# Patient Record
Sex: FEMALE | Race: BLACK OR AFRICAN AMERICAN | NOT HISPANIC OR LATINO | Employment: OTHER | URBAN - METROPOLITAN AREA
[De-identification: names, ages, dates, MRNs, and addresses within clinical notes are randomized per-mention and may not be internally consistent; named-entity substitution may affect disease eponyms.]

---

## 2017-05-01 ENCOUNTER — HOSPITAL ENCOUNTER (INPATIENT)
Facility: HOSPITAL | Age: 67
LOS: 1 days | Discharge: HOME | DRG: 065 | End: 2017-05-02
Attending: EMERGENCY MEDICINE | Admitting: PSYCHIATRY & NEUROLOGY
Payer: MEDICARE

## 2017-05-01 DIAGNOSIS — R07.9 CHEST PAIN: ICD-10-CM

## 2017-05-01 DIAGNOSIS — R29.810 MOUTH DROOP: ICD-10-CM

## 2017-05-01 DIAGNOSIS — I63.311 THROMBOTIC STROKE INVOLVING RIGHT MIDDLE CEREBRAL ARTERY: Primary | ICD-10-CM

## 2017-05-01 DIAGNOSIS — R00.1 BRADYCARDIA: ICD-10-CM

## 2017-05-01 DIAGNOSIS — I25.10 CORONARY ARTERY DISEASE, ANGINA PRESENCE UNSPECIFIED, UNSPECIFIED VESSEL OR LESION TYPE, UNSPECIFIED WHETHER NATIVE OR TRANSPLANTED HEART: ICD-10-CM

## 2017-05-01 DIAGNOSIS — I20.89 ANGINA AT REST: ICD-10-CM

## 2017-05-01 PROBLEM — E78.5 HYPERLIPIDEMIA: Status: ACTIVE | Noted: 2017-05-01

## 2017-05-01 PROBLEM — E11.9 DIABETES MELLITUS TYPE II, CONTROLLED: Status: ACTIVE | Noted: 2017-05-01

## 2017-05-01 PROBLEM — I10 HYPERTENSION: Status: ACTIVE | Noted: 2017-05-01

## 2017-05-01 PROBLEM — G81.94 HEMIPARESIS, LEFT: Status: ACTIVE | Noted: 2017-05-01

## 2017-05-01 LAB
ALBUMIN SERPL BCP-MCNC: 4.3 G/DL
ALP SERPL-CCNC: 93 U/L
ALT SERPL W/O P-5'-P-CCNC: 15 U/L
ANION GAP SERPL CALC-SCNC: 9 MMOL/L
AST SERPL-CCNC: 18 U/L
BASOPHILS # BLD AUTO: 0.04 K/UL
BASOPHILS NFR BLD: 0.8 %
BILIRUB SERPL-MCNC: 0.9 MG/DL
BNP SERPL-MCNC: 176 PG/ML
BUN SERPL-MCNC: 16 MG/DL
CALCIUM SERPL-MCNC: 10.5 MG/DL
CHLORIDE SERPL-SCNC: 104 MMOL/L
CHOLEST/HDLC SERPL: 3.2 {RATIO}
CO2 SERPL-SCNC: 29 MMOL/L
CREAT SERPL-MCNC: 1 MG/DL
D DIMER PPP IA.FEU-MCNC: 0.41 MG/L FEU
DIFFERENTIAL METHOD: NORMAL
EOSINOPHIL # BLD AUTO: 0.1 K/UL
EOSINOPHIL NFR BLD: 2.3 %
ERYTHROCYTE [DISTWIDTH] IN BLOOD BY AUTOMATED COUNT: 13.4 %
EST. GFR  (AFRICAN AMERICAN): >60 ML/MIN/1.73 M^2
EST. GFR  (NON AFRICAN AMERICAN): 58 ML/MIN/1.73 M^2
GLUCOSE SERPL-MCNC: 89 MG/DL
HCT VFR BLD AUTO: 41.9 %
HDL/CHOLESTEROL RATIO: 31 %
HDLC SERPL-MCNC: 113 MG/DL
HDLC SERPL-MCNC: 35 MG/DL
HGB BLD-MCNC: 14.1 G/DL
INR PPP: 0.9
LDLC SERPL CALC-MCNC: 59.2 MG/DL
LYMPHOCYTES # BLD AUTO: 1.8 K/UL
LYMPHOCYTES NFR BLD: 38.1 %
MCH RBC QN AUTO: 29.6 PG
MCHC RBC AUTO-ENTMCNC: 33.7 %
MCV RBC AUTO: 88 FL
MONOCYTES # BLD AUTO: 0.4 K/UL
MONOCYTES NFR BLD: 8.6 %
NEUTROPHILS # BLD AUTO: 2.4 K/UL
NEUTROPHILS NFR BLD: 50 %
NONHDLC SERPL-MCNC: 78 MG/DL
PLATELET # BLD AUTO: 293 K/UL
PMV BLD AUTO: 10.3 FL
POCT GLUCOSE: 113 MG/DL (ref 70–110)
POTASSIUM SERPL-SCNC: 3.8 MMOL/L
PROT SERPL-MCNC: 8.7 G/DL
PROTHROMBIN TIME: 9.8 SEC
RBC # BLD AUTO: 4.77 M/UL
SODIUM SERPL-SCNC: 142 MMOL/L
TRIGL SERPL-MCNC: 94 MG/DL
TROPONIN I SERPL DL<=0.01 NG/ML-MCNC: <0.006 NG/ML
TSH SERPL DL<=0.005 MIU/L-ACNC: 1.31 UIU/ML
WBC # BLD AUTO: 4.78 K/UL

## 2017-05-01 PROCEDURE — 93005 ELECTROCARDIOGRAM TRACING: CPT

## 2017-05-01 PROCEDURE — 99285 EMERGENCY DEPT VISIT HI MDM: CPT

## 2017-05-01 PROCEDURE — 96374 THER/PROPH/DIAG INJ IV PUSH: CPT

## 2017-05-01 PROCEDURE — 85379 FIBRIN DEGRADATION QUANT: CPT

## 2017-05-01 PROCEDURE — 85025 COMPLETE CBC W/AUTO DIFF WBC: CPT

## 2017-05-01 PROCEDURE — 99223 1ST HOSP IP/OBS HIGH 75: CPT | Mod: ,,, | Performed by: PSYCHIATRY & NEUROLOGY

## 2017-05-01 PROCEDURE — 96372 THER/PROPH/DIAG INJ SC/IM: CPT

## 2017-05-01 PROCEDURE — 80053 COMPREHEN METABOLIC PANEL: CPT

## 2017-05-01 PROCEDURE — 93010 ELECTROCARDIOGRAM REPORT: CPT | Mod: 76,,, | Performed by: INTERNAL MEDICINE

## 2017-05-01 PROCEDURE — 83036 HEMOGLOBIN GLYCOSYLATED A1C: CPT

## 2017-05-01 PROCEDURE — 84443 ASSAY THYROID STIM HORMONE: CPT

## 2017-05-01 PROCEDURE — 83880 ASSAY OF NATRIURETIC PEPTIDE: CPT

## 2017-05-01 PROCEDURE — 80061 LIPID PANEL: CPT

## 2017-05-01 PROCEDURE — 63600175 PHARM REV CODE 636 W HCPCS: Performed by: EMERGENCY MEDICINE

## 2017-05-01 PROCEDURE — 82962 GLUCOSE BLOOD TEST: CPT

## 2017-05-01 PROCEDURE — 93010 ELECTROCARDIOGRAM REPORT: CPT | Mod: ,,, | Performed by: INTERNAL MEDICINE

## 2017-05-01 PROCEDURE — 99285 EMERGENCY DEPT VISIT HI MDM: CPT | Mod: ,,, | Performed by: EMERGENCY MEDICINE

## 2017-05-01 PROCEDURE — 20600001 HC STEP DOWN PRIVATE ROOM

## 2017-05-01 PROCEDURE — 84484 ASSAY OF TROPONIN QUANT: CPT

## 2017-05-01 PROCEDURE — 63600175 PHARM REV CODE 636 W HCPCS: Performed by: PHYSICIAN ASSISTANT

## 2017-05-01 PROCEDURE — 25000003 PHARM REV CODE 250: Performed by: PHYSICIAN ASSISTANT

## 2017-05-01 PROCEDURE — 25500020 PHARM REV CODE 255: Performed by: EMERGENCY MEDICINE

## 2017-05-01 PROCEDURE — 85610 PROTHROMBIN TIME: CPT

## 2017-05-01 RX ORDER — LEVOTHYROXINE SODIUM 75 UG/1
75 TABLET ORAL
Status: DISCONTINUED | OUTPATIENT
Start: 2017-05-02 | End: 2017-05-02 | Stop reason: HOSPADM

## 2017-05-01 RX ORDER — LORAZEPAM 2 MG/ML
1 INJECTION INTRAMUSCULAR
Status: COMPLETED | OUTPATIENT
Start: 2017-05-01 | End: 2017-05-01

## 2017-05-01 RX ORDER — LEVOBUNOLOL HYDROCHLORIDE 5 MG/ML
1 SOLUTION/ DROPS OPHTHALMIC 2 TIMES DAILY
Status: DISCONTINUED | OUTPATIENT
Start: 2017-05-01 | End: 2017-05-02 | Stop reason: HOSPADM

## 2017-05-01 RX ORDER — TRAMADOL HYDROCHLORIDE AND ACETAMINOPHEN 37.5; 325 MG/1; MG/1
1 TABLET, FILM COATED ORAL DAILY
COMMUNITY
End: 2017-05-01

## 2017-05-01 RX ORDER — ATORVASTATIN CALCIUM 20 MG/1
40 TABLET, FILM COATED ORAL DAILY
Status: DISCONTINUED | OUTPATIENT
Start: 2017-05-02 | End: 2017-05-02 | Stop reason: HOSPADM

## 2017-05-01 RX ORDER — ALBUTEROL SULFATE 0.83 MG/ML
2.5 SOLUTION RESPIRATORY (INHALATION) EVERY 4 HOURS PRN
COMMUNITY

## 2017-05-01 RX ORDER — LOSARTAN POTASSIUM 100 MG/1
100 TABLET ORAL DAILY
COMMUNITY

## 2017-05-01 RX ORDER — ASPIRIN 325 MG
325 TABLET, DELAYED RELEASE (ENTERIC COATED) ORAL DAILY
Status: DISCONTINUED | OUTPATIENT
Start: 2017-05-02 | End: 2017-05-02 | Stop reason: HOSPADM

## 2017-05-01 RX ORDER — INSULIN GLARGINE 100 [IU]/ML
30 INJECTION, SOLUTION SUBCUTANEOUS NIGHTLY
COMMUNITY

## 2017-05-01 RX ORDER — BUDESONIDE AND FORMOTEROL FUMARATE DIHYDRATE 160; 4.5 UG/1; UG/1
2 AEROSOL RESPIRATORY (INHALATION) EVERY 12 HOURS
COMMUNITY

## 2017-05-01 RX ORDER — ALBUTEROL SULFATE 90 UG/1
2 AEROSOL, METERED RESPIRATORY (INHALATION) EVERY 6 HOURS PRN
Status: DISCONTINUED | OUTPATIENT
Start: 2017-05-01 | End: 2017-05-02 | Stop reason: HOSPADM

## 2017-05-01 RX ORDER — LEVOTHYROXINE SODIUM 75 UG/1
75 TABLET ORAL DAILY
COMMUNITY

## 2017-05-01 RX ORDER — FLUTICASONE FUROATE AND VILANTEROL 100; 25 UG/1; UG/1
1 POWDER RESPIRATORY (INHALATION) DAILY
Status: DISCONTINUED | OUTPATIENT
Start: 2017-05-02 | End: 2017-05-02 | Stop reason: HOSPADM

## 2017-05-01 RX ORDER — LORAZEPAM 2 MG/ML
INJECTION INTRAMUSCULAR
Status: DISPENSED
Start: 2017-05-01 | End: 2017-05-02

## 2017-05-01 RX ORDER — ALBUTEROL SULFATE 90 UG/1
2 AEROSOL, METERED RESPIRATORY (INHALATION) EVERY 6 HOURS PRN
COMMUNITY

## 2017-05-01 RX ORDER — ATORVASTATIN CALCIUM 40 MG/1
40 TABLET, FILM COATED ORAL DAILY
COMMUNITY

## 2017-05-01 RX ORDER — ALBUTEROL SULFATE 0.83 MG/ML
2.5 SOLUTION RESPIRATORY (INHALATION) EVERY 4 HOURS PRN
Status: DISCONTINUED | OUTPATIENT
Start: 2017-05-01 | End: 2017-05-02 | Stop reason: HOSPADM

## 2017-05-01 RX ORDER — TRAZODONE HYDROCHLORIDE 150 MG/1
150 TABLET ORAL NIGHTLY
COMMUNITY

## 2017-05-01 RX ORDER — ENOXAPARIN SODIUM 100 MG/ML
40 INJECTION SUBCUTANEOUS EVERY 24 HOURS
Status: DISCONTINUED | OUTPATIENT
Start: 2017-05-01 | End: 2017-05-02 | Stop reason: HOSPADM

## 2017-05-01 RX ORDER — SODIUM CHLORIDE 0.9 % (FLUSH) 0.9 %
3 SYRINGE (ML) INJECTION EVERY 8 HOURS
Status: DISCONTINUED | OUTPATIENT
Start: 2017-05-01 | End: 2017-05-02 | Stop reason: HOSPADM

## 2017-05-01 RX ORDER — OXYBUTYNIN CHLORIDE 5 MG/1
5 TABLET, EXTENDED RELEASE ORAL DAILY
COMMUNITY

## 2017-05-01 RX ORDER — MECLIZINE HYDROCHLORIDE CHEWABLE TABLETS 25 MG/1
25 TABLET, CHEWABLE ORAL 3 TIMES DAILY PRN
COMMUNITY

## 2017-05-01 RX ORDER — GABAPENTIN 100 MG/1
100 CAPSULE ORAL EVERY 8 HOURS
Status: DISCONTINUED | OUTPATIENT
Start: 2017-05-01 | End: 2017-05-02 | Stop reason: HOSPADM

## 2017-05-01 RX ORDER — ALPRAZOLAM 1 MG/1
1 TABLET ORAL NIGHTLY
Status: DISCONTINUED | OUTPATIENT
Start: 2017-05-01 | End: 2017-05-02 | Stop reason: HOSPADM

## 2017-05-01 RX ORDER — AMLODIPINE AND BENAZEPRIL HYDROCHLORIDE 5; 10 MG/1; MG/1
1 CAPSULE ORAL DAILY
COMMUNITY
End: 2017-05-04 | Stop reason: ALTCHOICE

## 2017-05-01 RX ORDER — AMOXICILLIN 500 MG
2 CAPSULE ORAL DAILY
COMMUNITY

## 2017-05-01 RX ADMIN — GABAPENTIN 100 MG: 100 CAPSULE ORAL at 09:05

## 2017-05-01 RX ADMIN — SODIUM CHLORIDE, PRESERVATIVE FREE 3 ML: 5 INJECTION INTRAVENOUS at 09:05

## 2017-05-01 RX ADMIN — IOHEXOL 75 ML: 350 INJECTION, SOLUTION INTRAVENOUS at 07:05

## 2017-05-01 RX ADMIN — LORAZEPAM 1 MG: 2 INJECTION, SOLUTION INTRAMUSCULAR; INTRAVENOUS at 06:05

## 2017-05-01 RX ADMIN — ALPRAZOLAM 1 MG: 1 TABLET ORAL at 09:05

## 2017-05-01 RX ADMIN — ENOXAPARIN SODIUM 40 MG: 100 INJECTION SUBCUTANEOUS at 07:05

## 2017-05-01 RX ADMIN — LEVOBUNOLOL HYDROCHLORIDE 1 DROP: 5 SOLUTION/ DROPS OPHTHALMIC at 09:05

## 2017-05-01 NOTE — ASSESSMENT & PLAN NOTE
Nyasia Vazquez is a 66 y.o. female with L facial droop and UMN weakness on L. CT negative for acute abnormalities. Patient presented initially with chest pain. She may be admitted to medicine with cards consult for her chest pain. If so, then stroke team will follow patient as consult. However, if cardiology workup not needed, then will admit to stroke service for stroke workup.    Antithrombotics for secondary stroke prevention: Antiplatelets:  Aspirin: 325 mg oral now and daily, Statins for secondary stroke prevention and hyperlipidemia, if present: Atorvastatin- 40 mg oral daily, Aggressive risk factor modification: Hypertension, Diabetes, High Cholesterol and Carotid Artery disease, Rehab Efforts: Physical Therapy, Occupational Therapy and Speech and Language Pathology, Diagnostics: Ordered/Pending CTA Head to assess vasculature , CTA Neck/Arch to assess vasculature, HgbA1C to assess blood glucose levels, Lipid Profile to assess cholesterol levels, MRI head without contrast to assess brain parenchyma, Trans-thoracic cardiac echo to assess cardiac function/status, TSH to assess thyroid function, VTE Prophylaxis: Enoxaparin 40 mg SQ every 24 hours, BP Parameters: SBP <220, Nursing Orders: Neuro checks- Q4H, Complete ANDRA unless evaluated by speech, Out of bed BID, Avoid Rucker catheter, Pneumatic compression device, Stroke Education, Blood glucose target 100-130, Up with assistance and IV Fluids: NS @ 75cc/hr

## 2017-05-01 NOTE — CONSULTS
[facility]  Cardiology Service ER Consult Note    Attending Physician: Mckay Cabrera MD  Reason for Consult: Chest Pain    HPI:     Nyasia Vazquez is a 66 year old female patient with a history of HTN, DM, non-obstructive CAD. The patient presented to the ED with sharp, intermittent chest pain in the left upper chest (just below left sholder) with radiation to her left arm since yesterday afternoon. The pain has positional component (was worse with lying down and better with sitting up last night). No exertional or pleuritic component. No similar pain in the past. She experienced some SOB when she was walking to the ED from the parking lot. However she reports that she always gets similar SOB with activity.    After arrival to the ED, the patient was found to have left sided facial droop and left arm mild weakness. Vascular neurology was consulted and they diagnosed with patient with ischemic stroke involving the right MCA territory. CT is negative for intracranial hemorrhage.    Cardiology is consulted for chest pain. Patient reports having multiple stress tests 8-10 years ago. And she thinks she had an angiogram and she was told to have 'mild blockages but no need for a stent'    ROS: Patient denies any fever, chills, PND, orthopnea, palpitations.      PMH:     Past Medical History:   Diagnosis Date    Asthma     Diabetes mellitus     Hypertension      History reviewed. No pertinent surgical history.     Medications:     No current facility-administered medications on file prior to encounter.      Current Outpatient Prescriptions on File Prior to Encounter   Medication Sig Dispense Refill    alprazolam (XANAX) 1 MG tablet Take 1 mg by mouth every evening.       aspirin 81 MG Chew Take 81 mg by mouth once daily.      gabapentin (NEURONTIN) 100 MG capsule Take 1 capsule (100 mg total) by mouth every 8 (eight) hours. 30 capsule 11    glimepiride (AMARYL) 4 MG tablet Take 4 mg by mouth 2  (two) times daily.       hydrochlorothiazide (HYDRODIURIL) 25 MG tablet Take 25 mg by mouth once daily.      [DISCONTINUED] albuterol (ACCUNEB) 0.63 mg/3 mL Nebu Take 0.63 mg by nebulization every 6 (six) hours as needed.      [DISCONTINUED] ondansetron (ZOFRAN) 4 MG tablet Take 1 tablet (4 mg total) by mouth every 6 (six) hours. 12 tablet 0        Physical Exam:     Vitals:  Temp:  [98.4 °F (36.9 °C)]   Pulse:  [42-43]   Resp:  [20]   BP: (117-139)/(56-64)   SpO2:  [99 %-100 %]        Physical Exam   Constitutional: She is oriented to person, place, and time. No distress.   HENT:   Head: Normocephalic and atraumatic.   Eyes: No scleral icterus.   Neck: No JVD present.   Cardiovascular: Normal rate and regular rhythm.  Exam reveals no friction rub.    No murmur heard.  Pulmonary/Chest: She has no wheezes. She has no rales. She exhibits no tenderness.   Abdominal: There is no tenderness. There is no rebound.   Musculoskeletal: She exhibits no edema.   Neurological: She is alert and oriented to person, place, and time.   Left facial droop and decreased sensation on the left side of face.    Skin: Skin is warm and dry. She is not diaphoretic.   Psychiatric: She has a normal mood and affect. Her behavior is normal.         Labs:     Recent Results (from the past 336 hour(s))   CBC auto differential    Collection Time: 05/01/17  1:01 PM   Result Value Ref Range    WBC 4.78 3.90 - 12.70 K/uL    Hemoglobin 14.1 12.0 - 16.0 g/dL    Hematocrit 41.9 37.0 - 48.5 %    Platelets 293 150 - 350 K/uL       No results found for this or any previous visit (from the past 336 hour(s)).      Recent Labs  Lab 05/01/17  1301   TROPONINI <0.006       Imaging:  CXR: No acute pathologies    Echo:  - No echo in the system    EKG:   Marked sinus bradycardia    Telemetry:   Sinus bradycardia    Assessment & Recommendations:     66 year old female patient with a history of HTN, DM, non-obstructive CAD. The patient was seen with the following  diagnoses:    1) Right MCA stroke  - Vascular neurology saw the patient. Will need stroke work-up.  2) Left Chest / Shoulder / Arm Pain  - Non-anginal pain based on location and description of the pain.  - No findings of ACS since troponin is negative despite chest pain since yesterday. ECG with no ischemic changes.  - With these findings, I don't think this pain is of cardiac origin.  3) Sinus bradycardia  - Asymptomatic. Chronic. Reports do signs/symptoms of bradycardia.    Recommendations:  - OK to admit to hospital medicine or stroke service for stroke work-up.  - Telemetry while inpatient.  - Check 1 more set of troponin tonight. Otherwise no need for further cardiac work-up from our stand point.  - Please notify us if the patient has any major abnormalities on the Echo which will be performed for stroke.  - Avoid betablockers or calcium channel blockers because of bradycardia.    Thank you for this consult. Further recommendations are pending the attending addendum. Please do not hesitate to page with questions.     Signed:  Grupo Coy MD  Cardiology Fellow, PGY-6  Pager: [phone]  5/1/2017 3:51 PM    Attending Addendum:

## 2017-05-01 NOTE — PROVIDER PROGRESS NOTES - EMERGENCY DEPT.
Encounter Date: 5/1/2017    ED Physician Progress Notes       SCRIBE NOTE: I, Cong Smith, am scribing for, and in the presence of,  Dr. Monson.  Physician Statement: I, Dr. Monson, personally performed the services described in this documentation as scribed by Cong Smith in my presence, and it is both accurate and complete.     Physician Note:   5:55 PM. I was called to the bedside as pt was ready for transfer to MRI. Pt complains of of upper left and lateral chest discomfort described as sharp, stabbing, with small pressure component. EKG shows rate of 47, no acute ST T changes. Physical exam is unremarkable. Pt is somewhat anxious. She is somewhat concerned that her blood sugar was going down. Her blood sugar 1 hour ago was 113, but she was given a small amount of juice PO. Pt was given 1 mg Ativan IV. She will forego her MRI.

## 2017-05-01 NOTE — ASSESSMENT & PLAN NOTE
Stroke risk factor  On insulin and amaryl at home  D/C home regimen  SSI while in hospital, transition to basal insulin as needed

## 2017-05-01 NOTE — CONSULTS
[facility]  Vascular Neurology  Comprehensive Stroke Center  Consult Note      Inpatient consult to Vascular (Stroke) Neurology  Consult performed by: LEATHA FAGAN  Consult ordered by: ANGIE PORTILLO        Subjective:     History of Present Illness:  Nyasia Vazquez is a 66 y.o. Female PMHx of DM, arthritis, asthma, HTN, HLD, and CAD who presented to the ED with chest pain and diaphoresis. On exam, ED doctor noted that the patient had a L facial droop and consults stroke team for further evaluation.    On arrival, patient complained of intermittent chest pain. She denied weakness, numbness, difficulty speaking, vision changes, nausea, and vomiting. Patient examined her reflection and noted that her face was asymmetric. She is unsure of when the facial droop began. Patient did not notice any facial asymmetry until asked to look at her reflection. Last known normal was last night at 11pm when she went to sleep.           Past Medical History:   Diagnosis Date    Asthma     Diabetes mellitus     Hypertension      History reviewed. No pertinent surgical history.  History reviewed. No pertinent family history.  Social History   Substance Use Topics    Smoking status: Never Smoker    Smokeless tobacco: None    Alcohol use No     Review of patient's allergies indicates:  No Known Allergies  Medications: I have reviewed the current medication administration record.      (Not in a hospital admission)    Review of Systems   Constitutional: Positive for diaphoresis. Negative for chills and fever.   Eyes: Negative for visual disturbance.   Respiratory: Negative for cough and shortness of breath.    Cardiovascular: Positive for chest pain. Negative for palpitations.   Gastrointestinal: Negative for nausea and vomiting.   Neurological: Positive for facial asymmetry. Negative for dizziness, speech difficulty, weakness, numbness and headaches.   Psychiatric/Behavioral: Negative for  agitation and behavioral problems.     Objective:     Vital Signs (Most Recent):  Temp: 98.4 °F (36.9 °C) (05/01/17 1223)  Pulse: (!) 43 (05/01/17 1438)  Resp: 20 (05/01/17 1438)  BP: 139/64 (05/01/17 1438)  SpO2: 100 % (05/01/17 1438)    Vital Signs Range (Last 24H):  Temp:  [98.4 °F (36.9 °C)]   Pulse:  [42-43]   Resp:  [20]   BP: (117-139)/(56-64)   SpO2:  [99 %-100 %]     Physical Exam   Constitutional: She is oriented to person, place, and time. She appears well-developed and well-nourished. No distress.   HENT:   Head: Normocephalic and atraumatic.   Eyes: Pupils are equal, round, and reactive to light.   Neck: Normal range of motion.   Cardiovascular: Bradycardia present.    Pulmonary/Chest: Effort normal. No respiratory distress.   Abdominal: Soft. She exhibits no distension.   Musculoskeletal: Normal range of motion.   Neurological: She is alert and oriented to person, place, and time. GCS eye subscore is 4 - spontaneous. GCS verbal subscore is 5 - oriented. GCS motor subscore is 6 - obeys commands.   Skin: Skin is warm and dry.   Psychiatric: She has a normal mood and affect. Her behavior is normal.   Vitals reviewed.      Neurological Exam:   LOC: alert and follows requests  Language: No aphasia  Speech: No dysarthria  Orientation: Person, Place, Time  Memory: Recent memory intact, Remote memory intact, Age correct, Month correct  Visual Fields (CN II): Full  EOM (CN III, IV, VI): Full/intact  Pupils (CN III, IV, VI): PERRL  Facial Sensation (CN V): Symmetric  Facial Movement (CN VII): lower weakness left lower  Hearing (CN VIII): intact bilaterally  Motor*: Arm Left:  Paretic:  4/5, Leg Left:   Paretic:  4/5, Arm Right:   Normal (5/5), Leg Right:   Normal (5/5) patient with UMN weakness on L  Cerebellar*: Normal limb, Normal gait  , Normal stance  Sensation: intact to light touch, temperature and vibration  Tone: Arm-Left: normal; Leg-Left: normal; Arm-Right: normal; Leg-Right: normal    Stroke Team  Times:   Last Known Normal Date and Time : 201723:00  Symptom Onset Date and Time:2017Time onset of stroke symptoms: unknown.    Stroke Team Called Date and Time: 201714:42  Stroke Team Arrived Date and Time: 201714:45  CT Interpretation Time: 15:00  Intervention decision time: none.  NIH Stroke Scale:  Interval: baseline (upon arrival/admit)  Level of Consciousness: 0 - alert  LOC Questions: 0 - answers both correctly  LOC Commands: 0 - performs both correctly  Best Gaze: 0 - normal  Visual: 0 - no visual loss  Facial Palsy: 2 - partial  Motor Left Arm: 0 - no drift  Motor Right Arm: 0 - no drift  Motor Left Le - no drift  Motor Right Le - no drift  Limb Ataxia: 0 - absent  Sensory: 0 - normal  Best Language: 0 - no aphasia  Dysarthria: 0 - normal articulation  Extinction and Inattention: 0 - no neglect  NIH Stroke Scale Total: 2  Yamila Coma Scale:  Best Eye Response: 4 - spontaneous  Best Motor Response: 6 - obeys commands  Best Verbal Response: 5 - oriented  Yamila Coma Scale Total: 15  Modified Miami Scale:   Timeline: Prior to symptoms onset  Modified Miami Score: 0 - no symptoms        Laboratory:  CMP:   Recent Labs  Lab 17  1301   CALCIUM 10.5   ALBUMIN 4.3   PROT 8.7*      K 3.8   CO2 29      BUN 16   CREATININE 1.0   ALKPHOS 93   ALT 15   AST 18   BILITOT 0.9     CBC:   Recent Labs  Lab 17  1301   WBC 4.78   RBC 4.77   HGB 14.1   HCT 41.9      MCV 88   MCH 29.6   MCHC 33.7     Lipid Panel: No results for input(s): CHOL, LDLCALC, HDL, TRIG in the last 168 hours.  Coagulation:   Recent Labs  Lab 17  1301   INR 0.9     Platelet Aggregation Study: No results for input(s): PLTAGG, PLTAGINTERP, PLTAGREGLACO, ADPPLTAGGREG in the last 168 hours.  Hgb A1C: No results for input(s): HGBA1C in the last 168 hours.  TSH: No results for input(s): TSH in the last 168 hours.    Diagnostic Results:  Brain Imaging: CT Head. Date: 17  Acute Pathology:  None      Assessment/Plan:     Patient is a 66 y.o. year old female with:    Thrombotic stroke involving right middle cerebral artery  Nyasia Vazquez is a 66 y.o. female with L facial droop and UMN weakness on L. CT negative for acute abnormalities. Patient presented initially with chest pain. She may be admitted to medicine with cards consult for her chest pain. If so, then stroke team will follow patient as consult. However, if cardiology workup not needed, then will admit to stroke service for stroke workup.    Antithrombotics for secondary stroke prevention: Antiplatelets:  Aspirin: 325 mg oral now and daily, Statins for secondary stroke prevention and hyperlipidemia, if present: Atorvastatin- 40 mg oral daily, Aggressive risk factor modification: Hypertension, Diabetes, High Cholesterol and Carotid Artery disease, Rehab Efforts: Physical Therapy, Occupational Therapy and Speech and Language Pathology, Diagnostics: Ordered/Pending CTA Head to assess vasculature , CTA Neck/Arch to assess vasculature, HgbA1C to assess blood glucose levels, Lipid Profile to assess cholesterol levels, MRI head without contrast to assess brain parenchyma, Trans-thoracic cardiac echo to assess cardiac function/status, TSH to assess thyroid function, VTE Prophylaxis: Enoxaparin 40 mg SQ every 24 hours, BP Parameters: SBP <220, Nursing Orders: Neuro checks- Q4H, Complete ANDRA unless evaluated by speech, Out of bed BID, Avoid Rucker catheter, Pneumatic compression device, Stroke Education, Blood glucose target 100-130, Up with assistance and IV Fluids: NS @ 75cc/hr                                Hypertension  Stroke risk factor  Hold home BP meds and allow permissive hypertension in setting of possible acute stroke  Use PRN BP meds for SBP >220    Diabetes mellitus type II, controlled  Stroke risk factor  On insulin and amaryl at home  D/C home regimen  SSI while in hospital, transition to basal insulin as  needed    Hyperlipidemia  Stroke risk factor  Continue home atorvastatin 40  Order lipid panel    Coronary artery disease  Stroke risk factor  Increase to aspirin 325  Continue home atorvastatin 40    Hemiparesis, left  UMN weakness on L  Possibly 2/2 stroke  Order MRI brain  Therapy to evaluate and treat    Facial droop  Possibly 2/2 stroke  Therapy to evaluate and treat      Thrombolysis Candidate? No  1. Contraindications: outside window    Interventional Revascularization Candidate?  No; No significant neurological deficit    Research Candidate? No        Jordana Moore PA-C  [facility]  Department of Vascular Neurology   [facility]

## 2017-05-01 NOTE — IP AVS SNAPSHOT
[facility]  [address]  [facility] [address]  Phone: [phone]           Patient Discharge Instructions   Our goal is to set you up for success. This packet includes information on your condition, medications, and your home care.  It will help you care for yourself to prevent having to return to the hospital.     Please ask your nurse if you have any questions.      There are many details to remember when preparing to leave the hospital. Here is what you will need to do:    1. Take your medicine. If you are prescribed medications, review your Medication List on the following pages. You may have new medications to  at the pharmacy and others that you'll need to stop taking. Review the instructions for how and when to take your medications. Talk with your doctor or nurses if you are unsure of what to do.     2. Go to your follow-up appointments. Specific follow-up information is listed in the following pages. Your may be contacted by a nurse or clinical provider about future appointments. Be sure we have all of the phone numbers to reach you. Please contact your provider's office if you are unable to make an appointment.     3. Watch for warning signs. Your doctor or nurse will give you detailed warning signs to watch for and when to call for assistance. These instructions may also include educational information about your condition. If you experience any of warning signs to your health, call your doctor.           [facility]  Unless otherwise directed by your provider, please   contact [facility], our nurse care line   that is available for 24/7 assistance.     [phone] (toll-free)     Registered nurses in the [facility]   provide: appointment scheduling, clinical advisement, health education, and other advisory services.                  ** Verify the list of medication(s) below is accurate and up to date. Carry this with you in case of  emergency. If your medications have changed, please notify your healthcare provider.             Medication List      START taking these medications        Additional Info                      aspirin 325 MG tablet   Refills:  0   Dose:  325 mg   Replaces:  aspirin 81 MG Chew    Instructions:  Take 1 tablet (325 mg total) by mouth once daily.     Begin Date    AM    Noon    PM    Bedtime         CONTINUE taking these medications        Additional Info                      * albuterol 2.5 mg /3 mL (0.083 %) nebulizer solution   Commonly known as:  PROVENTIL   Refills:  0   Dose:  2.5 mg    Instructions:  Take 2.5 mg by nebulization every 4 (four) hours as needed for Wheezing. Rescue     Begin Date    AM    Noon    PM    Bedtime       * albuterol 90 mcg/actuation inhaler   Refills:  0   Dose:  2 puff    Instructions:  Inhale 2 puffs into the lungs every 6 (six) hours as needed for Wheezing or Shortness of Breath. Rescue     Begin Date    AM    Noon    PM    Bedtime       alprazolam 1 MG tablet   Commonly known as:  XANAX   Refills:  0   Dose:  1 mg    Last time this was given:  1 mg on 5/1/2017  9:44 PM   Instructions:  Take 1 mg by mouth every evening.     Begin Date    AM    Noon    PM    Bedtime       amlodipine-benazepril 5-10 mg 5-10 mg per capsule   Commonly known as:  LOTREL   Refills:  0   Dose:  1 capsule    Instructions:  Take 1 capsule by mouth once daily.     Begin Date    AM    Noon    PM    Bedtime       atorvastatin 40 MG tablet   Commonly known as:  LIPITOR   Refills:  0   Dose:  40 mg    Last time this was given:  40 mg on 5/2/2017  8:41 AM   Instructions:  Take 40 mg by mouth once daily.     Begin Date    AM    Noon    PM    Bedtime       BLOOD GLUCOSE TEST Strp   Refills:  0   Generic drug:  blood sugar diagnostic    Instructions:  by Misc.(Non-Drug; Combo Route) route.     Begin Date    AM    Noon    PM    Bedtime       budesonide-formoterol 160-4.5 mcg 160-4.5 mcg/actuation Hfaa   Commonly known  as:  SYMBICORT   Refills:  0   Dose:  2 puff    Instructions:  Inhale 2 puffs into the lungs every 12 (twelve) hours. Controller     Begin Date    AM    Noon    PM    Bedtime       fish oil-omega-3 fatty acids 300-1,000 mg capsule   Refills:  0   Dose:  2 g    Instructions:  Take 2 g by mouth once daily.     Begin Date    AM    Noon    PM    Bedtime       gabapentin 100 MG capsule   Commonly known as:  NEURONTIN   Quantity:  30 capsule   Refills:  11   Dose:  100 mg    Last time this was given:  100 mg on 5/2/2017  1:30 PM   Instructions:  Take 1 capsule (100 mg total) by mouth every 8 (eight) hours.     Begin Date    AM    Noon    PM    Bedtime       glimepiride 4 MG tablet   Commonly known as:  AMARYL   Refills:  0   Dose:  4 mg    Instructions:  Take 4 mg by mouth 2 (two) times daily.     Begin Date    AM    Noon    PM    Bedtime       hydrochlorothiazide 25 MG tablet   Commonly known as:  HYDRODIURIL   Refills:  0   Dose:  25 mg    Instructions:  Take 25 mg by mouth once daily.     Begin Date    AM    Noon    PM    Bedtime       insulin glargine 100 unit/mL injection   Commonly known as:  LANTUS   Refills:  0   Dose:  30 Units    Instructions:  Inject 30 Units into the skin every evening.     Begin Date    AM    Noon    PM    Bedtime       insulin NPH-insulin regular (70/30) 100 unit/mL (70-30) injection   Commonly known as:  NOVOLIN 70/30   Refills:  0    Instructions:  Inject into the skin 2 (two) times daily.     Begin Date    AM    Noon    PM    Bedtime       levothyroxine 75 MCG tablet   Commonly known as:  SYNTHROID   Refills:  0   Dose:  75 mcg    Last time this was given:  75 mcg on 5/2/2017  5:14 AM   Instructions:  Take 75 mcg by mouth once daily.     Begin Date    AM    Noon    PM    Bedtime       losartan 100 MG tablet   Commonly known as:  COZAAR   Refills:  0   Dose:  100 mg    Instructions:  Take 100 mg by mouth once daily.     Begin Date    AM    Noon    PM    Bedtime       meclizine 32 MG tablet    Commonly known as:  ANTIVERT   Refills:  0   Dose:  25 mg    Instructions:  Take 25 mg by mouth 3 (three) times daily as needed.     Begin Date    AM    Noon    PM    Bedtime       oxybutynin 5 MG Tr24   Commonly known as:  DITROPAN-XL   Refills:  0   Dose:  5 mg    Instructions:  Take 5 mg by mouth once daily.     Begin Date    AM    Noon    PM    Bedtime       timolol 0.5 % ophthalmic solution   Refills:  0   Dose:  1 drop    Instructions:  Place 1 drop into both eyes 2 (two) times daily.     Begin Date    AM    Noon    PM    Bedtime       trazodone 150 MG tablet   Commonly known as:  DESYREL   Refills:  0   Dose:  150 mg    Instructions:  Take 150 mg by mouth nightly.     Begin Date    AM    Noon    PM    Bedtime       * Notice:  This list has 2 medication(s) that are the same as other medications prescribed for you. Read the directions carefully, and ask your doctor or other care provider to review them with you.      STOP taking these medications     aspirin 81 MG Chew   Replaced by:  aspirin 325 MG tablet            Where to Get Your Medications      You can get these medications from any pharmacy     You don't need a prescription for these medications     aspirin 325 MG tablet                  Please bring to all follow up appointments:    1. A copy of your discharge instructions.  2. All medicines you are currently taking in their original bottles.  3. Identification and insurance card.    Please arrive 15 minutes ahead of scheduled appointment time.    Please call 24 hours in advance if you must reschedule your appointment and/or time.        Your Scheduled Appointments     Jun 20, 2017 10:20 AM CDT   New Patient with GRAZYNA Hernandez - Neurology ([facility] )    [address]  [facility] [address] [phone]              Follow-up Information     Follow up with Jordana Lawson PA-C On 6/20/2017.    Specialty:  Neurology    Why:  @ 10:20    Contact information:     [address]  [facility] [address]  [phone]          Follow up with Dr. Raza Miller, DO In 1 week.    Why:  Diabetes management, A1C 8.7    Contact information:    [facility]        Discharge Instructions     Future Orders    Activity as tolerated     Call 911 for any of the following:     Comments:    Call 911  right away if any of the following warning signs come on suddenly, even if the symptoms only last for a few minutes. With stroke, timing is very important.   - Warning Signs of Stroke:  - Weakness: You may feel a sudden weakness, tingling or loss of feeling on one side of your face or body.  - Vision Problems: You may have sudden double vision or trouble seeing in one or both eyes.  - Speech Problems: You may have sudden trouble talking, slured speech, or problems understanding others.  - Headache: You may have sudden, severe headache.  - Movement Problems: You may experience dizziness, a feeling of spinning, a loss of balance, a feeling of falling or blackouts.    Diet Cardiac     Scheduling Instructions:    Diabetic diet    Comments:    See Stroke Patient Education Guide Booklet for details.        Primary Diagnosis     Your primary diagnosis was:  Stroke      Admission Information     Date & Time Provider Department CSN    5/1/2017 12:35 PM Kirt Regalado MD [facility] [id number]      Care Providers     Provider Role Specialty Primary office phone    Kirt Regalado MD Attending Provider Neurology [phone]    Evaristo Montelongo MD Team Attending  Neuro Critical Care [phone]    Nj Meza MD Team Attending  Neuro Critical Care [phone]    Lane Shepard MD Team Attending  Interventional Neurology [phone]    Kendall Joe MD Team Attending  Neurology [phone]      Your Vitals Were     BP Pulse Temp Resp Height Weight    134/65 (BP Location: Left arm, Patient Position: Lying, BP Method: Automatic) 49 99  "°F (37.2 °C) (Oral) 16 5' 6" (1.676 m) 90.3 kg (199 lb)    SpO2 BMI             99% 32.12 kg/m2         Recent Lab Values        5/1/2017                           6:53 PM           A1C 8.7 (H)           Comment for A1C at  6:53 PM on 5/1/2017:  According to ADA guidelines, hemoglobin A1C <7.0% represents  optimal control in non-pregnant diabetic patients.  Different  metrics may apply to specific populations.   Standards of Medical Care in Diabetes - 2016.  For the purpose of screening for the presence of diabetes:  <5.7%     Consistent with the absence of diabetes  5.7-6.4%  Consistent with increasing risk for diabetes   (prediabetes)  >or=6.5%  Consistent with diabetes  Currently no consensus exists for use of hemoglobin A1C  for diagnosis of diabetes for children.        Allergies as of 5/2/2017     No Known Allergies      Advance Directives     An advance directive is a document which, in the event you are no longer able to make decisions for yourself, tells your healthcare team what kind of treatment you do or do not want to receive, or who you would like to make those decisions for you.  If you do not currently have an advance directive, [facility] encourages you to create one.  For more information call:  [phone]), [phone] ([phone]),  or log on to [email or web address].        Language Assistance Services     ATTENTION: Language assistance services are available, free of charge. Please call [phone].      ATENCIÓN: Si habla español, tiene a mckeon disposición servicios gratuitos de asistencia lingüística. Llame al [phone].     [facility] Ý: N?u b?n nói Ti?ng Vi?t, có các d?ch v? h? tr? ngôn ng? mi?n phí dành cho b?n. G?i s? [phone].        Stroke Education              Diabetes Discharge Instructions                                   [facility] Sign-Up     Activating your [facility] account is as easy as 1-2-3!     1) Visit [email or web address], select Sign Up Now, enter this " activation code and your date of birth, then select Next.  [email or web address]  Expires: 6/16/2017  3:02 PM      2) Create a username and password to use when you visit [facility] in the future and select a security question in case you lose your password and select Next.    3) Enter your e-mail address and click Sign Up!    Additional Information  If you have questions, please e-mail [email or web address] or call [phone] to talk to our [facility] staff. Remember, [facility] is NOT to be used for urgent needs. For medical emergencies, dial 911.          [facility] complies with applicable Federal civil rights laws and does not discriminate on the basis of race, color, national origin, age, disability, or sex.

## 2017-05-01 NOTE — ASSESSMENT & PLAN NOTE
Stroke risk factor  Hold home BP meds and allow permissive hypertension in setting of possible acute stroke  Use PRN BP meds for SBP >220

## 2017-05-01 NOTE — SUBJECTIVE & OBJECTIVE
Past Medical History:   Diagnosis Date    Asthma     Diabetes mellitus     Hypertension      History reviewed. No pertinent surgical history.  History reviewed. No pertinent family history.  Social History   Substance Use Topics    Smoking status: Never Smoker    Smokeless tobacco: None    Alcohol use No     Review of patient's allergies indicates:  No Known Allergies  Medications: I have reviewed the current medication administration record.      (Not in a hospital admission)    Review of Systems   Constitutional: Positive for diaphoresis. Negative for chills and fever.   Eyes: Negative for visual disturbance.   Respiratory: Negative for cough and shortness of breath.    Cardiovascular: Positive for chest pain. Negative for palpitations.   Gastrointestinal: Negative for nausea and vomiting.   Neurological: Positive for facial asymmetry. Negative for dizziness, speech difficulty, weakness, numbness and headaches.   Psychiatric/Behavioral: Negative for agitation and behavioral problems.     Objective:     Vital Signs (Most Recent):  Temp: 98.4 °F (36.9 °C) (05/01/17 1223)  Pulse: (!) 43 (05/01/17 1438)  Resp: 20 (05/01/17 1438)  BP: 139/64 (05/01/17 1438)  SpO2: 100 % (05/01/17 1438)    Vital Signs Range (Last 24H):  Temp:  [98.4 °F (36.9 °C)]   Pulse:  [42-43]   Resp:  [20]   BP: (117-139)/(56-64)   SpO2:  [99 %-100 %]     Physical Exam   Constitutional: She is oriented to person, place, and time. She appears well-developed and well-nourished. No distress.   HENT:   Head: Normocephalic and atraumatic.   Eyes: Pupils are equal, round, and reactive to light.   Neck: Normal range of motion.   Cardiovascular: Bradycardia present.    Pulmonary/Chest: Effort normal. No respiratory distress.   Abdominal: Soft. She exhibits no distension.   Musculoskeletal: Normal range of motion.   Neurological: She is alert and oriented to person, place, and time. GCS eye subscore is 4 - spontaneous. GCS verbal subscore is 5 -  oriented. GCS motor subscore is 6 - obeys commands.   Skin: Skin is warm and dry.   Psychiatric: She has a normal mood and affect. Her behavior is normal.   Vitals reviewed.      Neurological Exam:   LOC: alert and follows requests  Language: No aphasia  Speech: No dysarthria  Orientation: Person, Place, Time  Memory: Recent memory intact, Remote memory intact, Age correct, Month correct  Visual Fields (CN II): Full  EOM (CN III, IV, VI): Full/intact  Pupils (CN III, IV, VI): PERRL  Facial Sensation (CN V): Symmetric  Facial Movement (CN VII): lower weakness left lower  Hearing (CN VIII): intact bilaterally  Motor*: Arm Left:  Paretic:  4/5, Leg Left:   Paretic:  4/5, Arm Right:   Normal (5/5), Leg Right:   Normal (5/5) patient with UMN weakness on L  Cerebellar*: Normal limb, Normal gait  , Normal stance  Sensation: intact to light touch, temperature and vibration  Tone: Arm-Left: normal; Leg-Left: normal; Arm-Right: normal; Leg-Right: normal    Stroke Team Times:   Last Known Normal Date and Time : 201723:00  Symptom Onset Date and Time:2017Time onset of stroke symptoms: unknown.    Stroke Team Called Date and Time: 201714:42  Stroke Team Arrived Date and Time: 201714:45  CT Interpretation Time: 15:00  Intervention decision time: none.  NIH Stroke Scale:  Interval: baseline (upon arrival/admit)  Level of Consciousness: 0 - alert  LOC Questions: 0 - answers both correctly  LOC Commands: 0 - performs both correctly  Best Gaze: 0 - normal  Visual: 0 - no visual loss  Facial Palsy: 2 - partial  Motor Left Arm: 0 - no drift  Motor Right Arm: 0 - no drift  Motor Left Le - no drift  Motor Right Le - no drift  Limb Ataxia: 0 - absent  Sensory: 0 - normal  Best Language: 0 - no aphasia  Dysarthria: 0 - normal articulation  Extinction and Inattention: 0 - no neglect  NIH Stroke Scale Total: 2  Yamila Coma Scale:  Best Eye Response: 4 - spontaneous  Best Motor Response: 6 - obeys commands  Best  Verbal Response: 5 - oriented  [place] Coma Scale Total: 15  Modified Sebastian Scale:   Timeline: Prior to symptoms onset  Modified Sebastian Score: 0 - no symptoms        Laboratory:  CMP:   Recent Labs  Lab 05/01/17  1301   CALCIUM 10.5   ALBUMIN 4.3   PROT 8.7*      K 3.8   CO2 29      BUN 16   CREATININE 1.0   ALKPHOS 93   ALT 15   AST 18   BILITOT 0.9     CBC:   Recent Labs  Lab 05/01/17  1301   WBC 4.78   RBC 4.77   HGB 14.1   HCT 41.9      MCV 88   MCH 29.6   MCHC 33.7     Lipid Panel: No results for input(s): CHOL, LDLCALC, HDL, TRIG in the last 168 hours.  Coagulation:   Recent Labs  Lab 05/01/17  1301   INR 0.9     Platelet Aggregation Study: No results for input(s): PLTAGG, PLTAGINTERP, PLTAGREGLACO, ADPPLTAGGREG in the last 168 hours.  Hgb A1C: No results for input(s): HGBA1C in the last 168 hours.  TSH: No results for input(s): TSH in the last 168 hours.    Diagnostic Results:  Brain Imaging: CT Head. Date: 05/01/17  Acute Pathology: None

## 2017-05-01 NOTE — ED PROVIDER NOTES
Encounter Date: 5/1/2017    SCRIBE #1 NOTE: I, Naomi Bar, am scribing for, and in the presence of,  Dr. Cabrera. I have scribed the following portions of the note - the Resident attestation.   SCRIBE #2 NOTE: I, Cong Smith, am scribing for, and in the presence of,  Dr. Monson. I have scribed the following portions of the note - the EKG reading.     History     Chief Complaint   Patient presents with    Chest Pain     with sob started yest, denies cardiac hx     Review of patient's allergies indicates:  No Known Allergies  HPI Comments: 67 y/o F PMHx DM2, HTN p/w chest pain x1d.  Patient notes symptoms started last night, involve last sided sharp, crushing chest pain that radiates down left arm. Pain is worse with laying down but improves in upright position, she has never had pain like this before, walking did not make it worse, sitting does not improve. Has had accompanying SOB, diaphoresis, has not had accompanying weakness/numbness, anxiety, hemoptysis, cough, N/V, recent illness, light-headedness, vertigo.     Upon initial exam left mouth droop was noted + nasolabial fold flattening, daughter confirms that this is new as of today (daughter lives with patient).  Patient said that her face looked normal in the mirror last night.     The history is provided by the patient and a relative.     Past Medical History:   Diagnosis Date    Asthma     Diabetes mellitus     Hypertension      History reviewed. No pertinent surgical history.  History reviewed. No pertinent family history.  Social History   Substance Use Topics    Smoking status: Never Smoker    Smokeless tobacco: None    Alcohol use No     Review of Systems   Constitutional: Positive for diaphoresis. Negative for chills and fever.   HENT: Negative for facial swelling, rhinorrhea, trouble swallowing and voice change.    Eyes: Negative for photophobia, pain and visual disturbance.   Respiratory: Positive for shortness of breath. Negative for cough,  choking, chest tightness and stridor.    Cardiovascular: Positive for chest pain. Negative for palpitations and leg swelling.   Gastrointestinal: Negative for diarrhea, nausea and vomiting.   Genitourinary: Negative for dysuria and hematuria.   Musculoskeletal: Negative for arthralgias, myalgias, neck pain and neck stiffness.   Skin: Negative for color change, pallor, rash and wound.   Neurological: Negative for dizziness, seizures, syncope, facial asymmetry, speech difficulty, weakness and light-headedness.       Physical Exam   Initial Vitals   BP Pulse Resp Temp SpO2   05/01/17 1223 05/01/17 1223 05/01/17 1223 05/01/17 1223 05/01/17 1223   117/56 42 20 98.4 °F (36.9 °C) 99 %     Physical Exam    Nursing note and vitals reviewed.  Constitutional: She appears well-developed and well-nourished. She is not diaphoretic. No distress.   HENT:   Head: Normocephalic and atraumatic.   Right Ear: External ear normal.   Left Ear: External ear normal.   Mouth/Throat: Oropharynx is clear and moist. No oropharyngeal exudate.   Eyes: Conjunctivae and EOM are normal. Pupils are equal, round, and reactive to light. Right eye exhibits no discharge. Left eye exhibits no discharge. No scleral icterus.   Neck: Normal range of motion. No tracheal deviation present. No JVD present.   Cardiovascular: Regular rhythm, normal heart sounds and intact distal pulses. Exam reveals no friction rub.    No murmur heard.  Bradycardic to 42   Pulmonary/Chest: Breath sounds normal. No stridor. No respiratory distress. She has no wheezes. She has no rhonchi. She exhibits no tenderness.   Abdominal: Soft. There is no tenderness. There is no rebound and no guarding.   Musculoskeletal: Normal range of motion. She exhibits no edema.   Neurological: She is alert and oriented to person, place, and time. She has normal strength. A cranial nerve deficit is present. No sensory deficit. Coordination normal.   Left mouth droop with nasal labial fold  Sensation  to light touch equal in CN V and extremities  No slurred speech, periorbital weakness, trouble swallowing, shoulder weakness, EOM abnormality on exam.  Muscle strength 5/5 in all extremities.          ED Course   Procedures  Labs Reviewed   COMPREHENSIVE METABOLIC PANEL - Abnormal; Notable for the following:        Result Value    Total Protein 8.7 (*)     eGFR if non  58.0 (*)     All other components within normal limits   B-TYPE NATRIURETIC PEPTIDE - Abnormal; Notable for the following:      (*)     All other components within normal limits   CBC W/ AUTO DIFFERENTIAL   TROPONIN I   PROTIME-INR   D DIMER, QUANTITATIVE   D DIMER, QUANTITATIVE    Narrative:     ADD ON PER DR PORTILLO, D DIMER, ORDER #[id number]   14:01  05/01/2017      EKG Readings: (Independently Interpreted)   Initial Reading: No STEMI.   EKG#1: Marked sinus bradycardia at 42 bpm, nl axis, nl intervals, no hypertrophy, no ST-T changes as read by me.  Bradycardia is new in comparison to previous EKG on 7/24/14.     EKG: marked sinus bradycardia, rate of 38 bpm, no acute ST T changes, no ectopy, rate decreased by 4 bpm compared to EKG that was done at 1230.           Medical Decision Making:   History:   Old Medical Records: I decided to obtain old medical records.  Initial Assessment:   65 y/o F PMHx DM2 HTN p/w chest pain x1d with radiation into shoulder + SOB, found on exam to be bradycardic with left-sided mouth droop, rest of exam normal, maintaining BP    Differential very broad include: ACS/STEMI/NSTEMI/UNSTABLE ANGINA, PE, aortic dissection, palpitations, PNA, costochondritis, CVA, carotid/vertebral dissection, many more.     Ordered cardiac workup, r/o ACS. Ordered D-dimer as patient is low-risk per [place], but does not PERC out. CXR to examine for lung pathology/mediastinal widening. Patient not currently having symptoms on first interview, will re-evaluate carefully. Almost certainly admit.    Given patient's new  left mouth droop w/ nasolabial flattening, have consulted [facility] neurology for guidance.  Currently examining patient.     Chart review shows previous episodes of bradycardia with adequate BP.    MD DAKOTA Newman - I  2:52 PM 5/1/2017    Vascular neurology concerned for patient's facial symptoms, will admit patient. Spoke to cardiology who do not feel that her symptoms are cardiac in nature, but are happy to follow in the care of Vneurology.  Troponin WNL, D-Dimer not elevated safely ruling out PE, BNP minimally elevated.    MD DAKOTA Newman I  4:47 PM 5/1/2017      Independently Interpreted Test(s):   I have ordered and independently interpreted EKG Reading(s) - see prior notes  Clinical Tests:   Lab Tests: Reviewed and Ordered  Radiological Study: Reviewed and Ordered  Medical Tests: Reviewed and Ordered            Scribe Attestation:   Scribe #1: I performed the above scribed service and the documentation accurately describes the services I performed. I attest to the accuracy of the note.  Scribe #2: I performed the above scribed service and the documentation accurately describes the services I performed. I attest to the accuracy of the note.    Attending Attestation:   Physician Attestation Statement for Resident:  As the supervising MD   Physician Attestation Statement: I have personally seen and examined this patient.   I agree with the above history. -: Pt presents with chest pain radiating to her left arm for a couple of days, occurring at rest and not worsening with exertion. Pt is bradycardic and borderline low blood pressure. On exam she has also noted left sided facial droop but no dysarthria and grossly normal strength, sensation, coordination, and cranial nerves. Pt may have concurrent CVA with chest pain concerning for ACS. She will need admission. No indication for TPA considering last known normal time was yesterday. Will also consult [facility] neuro.   As the supervising MD I agree with  the above PE.    As the supervising MD I agree with the above treatment, course, plan, and disposition.          Physician Attestation for Scribe:  Physician Attestation Statement for Scribe #1: I, Dr. Cabrera, reviewed documentation, as scribed by Naomi Bar in my presence, and it is both accurate and complete.   Physician Attestation Statement for Scribe #2: I, Dr. Monson, reviewed documentation, as scribed by Cong Smith in my presence, and it is both accurate and complete. I also acknowledge and confirm the content of the note done by Desireibcole #1.              ED Course     Clinical Impression:   Diagnoses of Chest pain, Angina at rest, and Mouth droop were pertinent to this visit.          Mckay Cabrera MD  05/02/17 0754

## 2017-05-01 NOTE — ED TRIAGE NOTES
Pt reports chest pain that radiates down the left arm x1 day. Pt reports SOB which worsen upon exertion. Pt denies n/v/Flu-like symptoms.

## 2017-05-01 NOTE — PROVIDER PROGRESS NOTES - EMERGENCY DEPT.
Encounter Date: 5/1/2017    ED Physician Progress Notes       SCRIBE NOTE: I, Jud Fay, am scribing for, and in the presence of,  Dr. Fong.  Physician Statement: I, Dr. Fong, personally performed the services described in this documentation as scribed by Jud Fay in my presence, and it is both accurate and complete.      EKG - STEMI Decision  Initial Reading: No STEMI present.

## 2017-05-02 VITALS
RESPIRATION RATE: 16 BRPM | TEMPERATURE: 99 F | OXYGEN SATURATION: 99 % | SYSTOLIC BLOOD PRESSURE: 134 MMHG | DIASTOLIC BLOOD PRESSURE: 65 MMHG | HEART RATE: 49 BPM | WEIGHT: 199 LBS | BODY MASS INDEX: 31.98 KG/M2 | HEIGHT: 66 IN

## 2017-05-02 PROBLEM — R00.1 BRADYCARDIA: Status: ACTIVE | Noted: 2017-05-02

## 2017-05-02 LAB
ALBUMIN SERPL BCP-MCNC: 3.2 G/DL
ALP SERPL-CCNC: 70 U/L
ALT SERPL W/O P-5'-P-CCNC: 11 U/L
ANION GAP SERPL CALC-SCNC: 5 MMOL/L
APTT BLDCRRT: 22.8 SEC
AST SERPL-CCNC: 15 U/L
BASOPHILS # BLD AUTO: 0.03 K/UL
BASOPHILS NFR BLD: 0.6 %
BILIRUB SERPL-MCNC: 0.5 MG/DL
BUN SERPL-MCNC: 13 MG/DL
CALCIUM SERPL-MCNC: 9 MG/DL
CHLORIDE SERPL-SCNC: 106 MMOL/L
CK MB SERPL-MCNC: 1.2 NG/ML
CK MB SERPL-RTO: 1.2 %
CK SERPL-CCNC: 104 U/L
CO2 SERPL-SCNC: 31 MMOL/L
CREAT SERPL-MCNC: 1 MG/DL
DIASTOLIC DYSFUNCTION: YES
DIFFERENTIAL METHOD: NORMAL
EOSINOPHIL # BLD AUTO: 0.1 K/UL
EOSINOPHIL NFR BLD: 2.8 %
ERYTHROCYTE [DISTWIDTH] IN BLOOD BY AUTOMATED COUNT: 13.5 %
EST. GFR  (AFRICAN AMERICAN): >60 ML/MIN/1.73 M^2
EST. GFR  (NON AFRICAN AMERICAN): 58 ML/MIN/1.73 M^2
ESTIMATED AVG GLUCOSE: 203 MG/DL
ESTIMATED PA SYSTOLIC PRESSURE: 38.28
GLUCOSE SERPL-MCNC: 240 MG/DL
HBA1C MFR BLD HPLC: 8.7 %
HCT VFR BLD AUTO: 37.1 %
HGB BLD-MCNC: 12 G/DL
INR PPP: 0.9
LYMPHOCYTES # BLD AUTO: 2 K/UL
LYMPHOCYTES NFR BLD: 43.3 %
MAGNESIUM SERPL-MCNC: 2.2 MG/DL
MCH RBC QN AUTO: 29.6 PG
MCHC RBC AUTO-ENTMCNC: 32.3 %
MCV RBC AUTO: 91 FL
MONOCYTES # BLD AUTO: 0.4 K/UL
MONOCYTES NFR BLD: 8.6 %
NEUTROPHILS # BLD AUTO: 2.1 K/UL
NEUTROPHILS NFR BLD: 44.5 %
PHOSPHATE SERPL-MCNC: 3.2 MG/DL
PLATELET # BLD AUTO: 253 K/UL
PMV BLD AUTO: 10.6 FL
POCT GLUCOSE: 246 MG/DL (ref 70–110)
POCT GLUCOSE: 284 MG/DL (ref 70–110)
POTASSIUM SERPL-SCNC: 4.2 MMOL/L
PROT SERPL-MCNC: 6.4 G/DL
PROTHROMBIN TIME: 10.1 SEC
RBC # BLD AUTO: 4.06 M/UL
RETIRED EF AND QEF - SEE NOTES: 60 (ref 55–65)
SODIUM SERPL-SCNC: 142 MMOL/L
TROPONIN I SERPL DL<=0.01 NG/ML-MCNC: <0.006 NG/ML
WBC # BLD AUTO: 4.64 K/UL

## 2017-05-02 PROCEDURE — 85610 PROTHROMBIN TIME: CPT

## 2017-05-02 PROCEDURE — 99231 SBSQ HOSP IP/OBS SF/LOW 25: CPT | Mod: GC,,, | Performed by: PSYCHIATRY & NEUROLOGY

## 2017-05-02 PROCEDURE — 84484 ASSAY OF TROPONIN QUANT: CPT

## 2017-05-02 PROCEDURE — 80053 COMPREHEN METABOLIC PANEL: CPT

## 2017-05-02 PROCEDURE — G8997 SWALLOW GOAL STATUS: HCPCS | Mod: CH

## 2017-05-02 PROCEDURE — 93306 TTE W/DOPPLER COMPLETE: CPT | Mod: 26,,, | Performed by: INTERNAL MEDICINE

## 2017-05-02 PROCEDURE — 83735 ASSAY OF MAGNESIUM: CPT

## 2017-05-02 PROCEDURE — 85730 THROMBOPLASTIN TIME PARTIAL: CPT

## 2017-05-02 PROCEDURE — 25000003 PHARM REV CODE 250: Performed by: PHYSICIAN ASSISTANT

## 2017-05-02 PROCEDURE — 84100 ASSAY OF PHOSPHORUS: CPT

## 2017-05-02 PROCEDURE — 63600175 PHARM REV CODE 636 W HCPCS: Performed by: NURSE PRACTITIONER

## 2017-05-02 PROCEDURE — 92523 SPEECH SOUND LANG COMPREHEN: CPT

## 2017-05-02 PROCEDURE — 36415 COLL VENOUS BLD VENIPUNCTURE: CPT

## 2017-05-02 PROCEDURE — G8998 SWALLOW D/C STATUS: HCPCS | Mod: CH

## 2017-05-02 PROCEDURE — 85025 COMPLETE CBC W/AUTO DIFF WBC: CPT

## 2017-05-02 PROCEDURE — G8996 SWALLOW CURRENT STATUS: HCPCS | Mod: CH

## 2017-05-02 PROCEDURE — 97165 OT EVAL LOW COMPLEX 30 MIN: CPT

## 2017-05-02 PROCEDURE — C8929 TTE W OR WO FOL WCON,DOPPLER: HCPCS

## 2017-05-02 PROCEDURE — 82553 CREATINE MB FRACTION: CPT

## 2017-05-02 PROCEDURE — 92610 EVALUATE SWALLOWING FUNCTION: CPT

## 2017-05-02 PROCEDURE — 97161 PT EVAL LOW COMPLEX 20 MIN: CPT

## 2017-05-02 PROCEDURE — 25000242 PHARM REV CODE 250 ALT 637 W/ HCPCS: Performed by: PHYSICIAN ASSISTANT

## 2017-05-02 RX ORDER — GLUCAGON 1 MG
1 KIT INJECTION
Status: DISCONTINUED | OUTPATIENT
Start: 2017-05-02 | End: 2017-05-02 | Stop reason: HOSPADM

## 2017-05-02 RX ORDER — IBUPROFEN 200 MG
24 TABLET ORAL
Status: DISCONTINUED | OUTPATIENT
Start: 2017-05-02 | End: 2017-05-02 | Stop reason: HOSPADM

## 2017-05-02 RX ORDER — IBUPROFEN 200 MG
16 TABLET ORAL
Status: DISCONTINUED | OUTPATIENT
Start: 2017-05-02 | End: 2017-05-02 | Stop reason: HOSPADM

## 2017-05-02 RX ORDER — ASPIRIN 325 MG
325 TABLET ORAL DAILY
Refills: 0 | COMMUNITY
Start: 2017-05-02 | End: 2018-05-02

## 2017-05-02 RX ORDER — ASPIRIN 325 MG
325 TABLET ORAL DAILY
Status: DISCONTINUED | OUTPATIENT
Start: 2017-05-02 | End: 2017-05-02 | Stop reason: HOSPADM

## 2017-05-02 RX ORDER — INSULIN ASPART 100 [IU]/ML
1-10 INJECTION, SOLUTION INTRAVENOUS; SUBCUTANEOUS
Status: DISCONTINUED | OUTPATIENT
Start: 2017-05-02 | End: 2017-05-02 | Stop reason: HOSPADM

## 2017-05-02 RX ADMIN — SODIUM CHLORIDE, PRESERVATIVE FREE 3 ML: 5 INJECTION INTRAVENOUS at 05:05

## 2017-05-02 RX ADMIN — GABAPENTIN 100 MG: 100 CAPSULE ORAL at 01:05

## 2017-05-02 RX ADMIN — INSULIN ASPART 4 UNITS: 100 INJECTION, SOLUTION INTRAVENOUS; SUBCUTANEOUS at 11:05

## 2017-05-02 RX ADMIN — LEVOTHYROXINE SODIUM 75 MCG: 75 TABLET ORAL at 05:05

## 2017-05-02 RX ADMIN — FLUTICASONE FUROATE AND VILANTEROL TRIFENATATE 1 PUFF: 100; 25 POWDER RESPIRATORY (INHALATION) at 08:05

## 2017-05-02 RX ADMIN — LEVOBUNOLOL HYDROCHLORIDE 1 DROP: 5 SOLUTION/ DROPS OPHTHALMIC at 08:05

## 2017-05-02 RX ADMIN — GABAPENTIN 100 MG: 100 CAPSULE ORAL at 05:05

## 2017-05-02 RX ADMIN — ATORVASTATIN CALCIUM 40 MG: 20 TABLET, FILM COATED ORAL at 08:05

## 2017-05-02 RX ADMIN — ASPIRIN 325 MG: 325 TABLET, DELAYED RELEASE ORAL at 08:05

## 2017-05-02 RX ADMIN — SODIUM CHLORIDE, PRESERVATIVE FREE 3 ML: 5 INJECTION INTRAVENOUS at 01:05

## 2017-05-02 NOTE — PLAN OF CARE
05/02/2017      Nyasia GARCIA [address]  [place] [address]          Vascular Neurology Dept.  [facility] [address] [place] [address] [phone] after hours   Diagnosis:  Thrombotic stroke involving right middle cerebral artery                          Please evaluate and treat patient for her physical therapy needs.           __________________________  Jordana Moore PA-C  05/02/2017

## 2017-05-02 NOTE — ASSESSMENT & PLAN NOTE
intial presenting symptom  Cardiology consulted in ED and chest pain workup negative  troponins negative  EKG without evidence of MI

## 2017-05-02 NOTE — PROGRESS NOTES
[facility]  Vascular Neurology  Com[facility] Stroke Center  Progress Note      Neurologic Chief Complaint: L facial droop    Subjective:     Interval History: Patient is seen for follow-up neurological assessment and treatment recommendations: HERNÁN, patient complains of intermittent CP, cardiac workup negative, asymptomatic bradycardia    HPI, Past Medical, Family, and Social History remains the same as documented in the initial encounter.     Review of Systems   Constitutional: Negative for chills, diaphoresis and fever.   Eyes: Negative for visual disturbance.   Respiratory: Negative for cough and shortness of breath.    Cardiovascular: Positive for chest pain. Negative for palpitations.   Gastrointestinal: Negative for nausea and vomiting.   Neurological: Positive for facial asymmetry. Negative for dizziness, speech difficulty, weakness, numbness and headaches.   Psychiatric/Behavioral: Negative for agitation and behavioral problems.     Scheduled Meds:   alprazolam  1 mg Oral QHS    aspirin  325 mg Oral Daily    atorvastatin  40 mg Oral Daily    enoxaparin  40 mg Subcutaneous Daily    fluticasone-vilanterol  1 puff Inhalation Daily    gabapentin  100 mg Oral Q8H    levobunolol  1 drop Both Eyes BID    levothyroxine  75 mcg Oral Before breakfast    sodium chloride 0.9%  3 mL Intravenous Q8H    trazodone  150 mg Oral Nightly     Continuous Infusions:   sodium chloride 0.9%       PRN Meds:albuterol, albuterol sulfate, dextrose 50%, dextrose 50%, glucagon (human recombinant), glucose, glucose, insulin aspart, sodium chloride 0.9%    Objective:     Vital Signs (Most Recent):  Temp: 97.3 °F (36.3 °C) (05/02/17 0400)  Pulse: (!) 50 (05/02/17 0842)  Resp: 14 (05/02/17 0842)  BP: (!) 120/53 (05/02/17 0400)  SpO2: 95 % (05/02/17 0842)  BP Location: Left arm    Vital Signs Range (Last 24H):  Temp:  [97.3 °F (36.3 °C)-98.9 °F (37.2 °C)]   Pulse:  [42-64]   Resp:  [13-20]   BP:  (117-167)/(53-72)   SpO2:  [94 %-100 %]   BP Location: Left arm    Physical Exam   Constitutional: She is oriented to person, place, and time. She appears well-developed and well-nourished. No distress.   HENT:   Head: Normocephalic and atraumatic.   Eyes: EOM are normal. Pupils are equal, round, and reactive to light.   Neck: Normal range of motion.   Cardiovascular: Bradycardia present.    Pulmonary/Chest: Effort normal. No respiratory distress.   Abdominal: Soft. She exhibits no distension.   Musculoskeletal: Normal range of motion.   Neurological: She is alert and oriented to person, place, and time.   Skin: Skin is warm and dry.   Psychiatric: She has a normal mood and affect. Her behavior is normal.   Vitals reviewed.      Neurological Exam:   LOC: alert and follows requests  Language: No aphasia  Speech: No dysarthria  Orientation: Person, Place, Time  Memory: Recent memory intact, Remote memory intact, Age correct, Month correct  Visual Fields (CN II): Full  EOM (CN III, IV, VI): Full/intact  Pupils (CN III, IV, VI): PERRL  Facial Sensation (CN V): Symmetric  Facial Movement (CN VII): lower weakness left lower  Hearing (CN VIII): intact bilaterally  Motor*: Arm Left: Paretic: 4/5, Leg Left: Paretic: 4/5, Arm Right: Normal (5/5), Leg Right: Normal (5/5) patient with UMN weakness on L  Cerebellar*: Normal limb, Normal gait , Normal stance  Sensation: intact to light touch, temperature and vibration  Tone: Arm-Left: normal; Leg-Left: normal; Arm-Right: normal; Leg-Right: normal    Stroke Team Times:   Last Known Normal Date and Time : 4/30/201723:00  Symptom Onset Date and Time:5/1/2017Time onset of stroke symptoms: unknown.    Stroke Team Called Date and Time: 5/1/201714:42  Stroke Team Arrived Date and Time: 5/1/201714:45  CT Interpretation Time: 15:00  Intervention decision time: none.  NIH Stroke Scale:  Interval: baseline (upon arrival/admit)  Level of Consciousness: 0 - alert  LOC Questions: 0 - answers  both correctly  LOC Commands: 0 - performs both correctly  Best Gaze: 0 - normal  Visual: 0 - no visual loss  Facial Palsy: 2 - partial  Motor Left Arm: 0 - no drift  Motor Right Arm: 0 - no drift  Motor Left Le - no drift  Motor Right Le - no drift  Limb Ataxia: 0 - absent  Sensory: 0 - normal  Best Language: 0 - no aphasia  Dysarthria: 0 - normal articulation  Extinction and Inattention: 0 - no neglect  NIH Stroke Scale Total: 2      Laboratory:  CMP:   Recent Labs  Lab 17   CALCIUM 9.0   ALBUMIN 3.2*   PROT 6.4      K 4.2   CO2 31*      BUN 13   CREATININE 1.0   ALKPHOS 70   ALT 11   AST 15   BILITOT 0.5     CBC:   Recent Labs  Lab 17   WBC 4.64   RBC 4.06   HGB 12.0   HCT 37.1      MCV 91   MCH 29.6   MCHC 32.3     Lipid Panel:   Recent Labs  Lab 17   CHOL 113*   LDLCALC 59.2*   HDL 35*   TRIG 94     Coagulation:   Recent Labs  Lab 17   INR 0.9   APTT 22.8     Platelet Aggregation Study: No results for input(s): PLTAGG, PLTAGINTERP, PLTAGREGLACO, ADPPLTAGGREG in the last 168 hours.  Hgb A1C: No results for input(s): HGBA1C in the last 168 hours.  TSH:   Recent Labs  Lab 17   TSH 1.312       Diagnostic Results:  I have personally reviewed:   Findings:     CT Head. Date: 17  -no acute intracranial abnormality    CTA Head/Neck. Date: 17  -Atherosclerosis of the bilateral carotid bulbs and proximal ICAs, right greater than left, with less than 50% stenosis bilaterally.  -no evidence of vascular occlusion or aneurysm     MRI Head. Date: 17  -no acute infarct    Echo. Date: 17  -EF 60% with diastolic dysfunction  -mild LAE  -concentric remodeling    Assessment/Plan:          * Thrombotic stroke involving right middle cerebral artery  Nyasia Vazquez is a 66 y.o. female with L facial droop and UMN weakness on L. CT negative for acute abnormalities. CTA negative for significant vascular stenosis or  occlusion. MRI negative for acute stroke. Given patient's persisting symptoms, it's possible that she could have a very small MRI negative stroke.    Antithrombotics for secondary stroke prevention: Antiplatelets:  Aspirin: 325 mg oral now and daily, Statins for secondary stroke prevention and hyperlipidemia, if present: Atorvastatin- 40 mg oral daily, Aggressive risk factor modification: Hypertension, Diabetes, High Cholesterol and Carotid Artery disease, Rehab Efforts: Physical Therapy, Occupational Therapy and Speech and Language Pathology, Diagnostics: Ordered/Pending HgbA1C to assess blood glucose levels,  VTE Prophylaxis: Enoxaparin 40 mg SQ every 24 hours, BP Parameters: SBP <220, Nursing Orders: Neuro checks- Q4H, Complete ANDRA unless evaluated by speech, Out of bed BID, Avoid Rucker catheter, Pneumatic compression device, Stroke Education, Blood glucose target 100-130, Up with assistance and IV Fluids: NS @ 75cc/hr                                Hypertension  Stroke risk factor  Hold home BP meds and allow permissive hypertension in setting of possible acute stroke  Use PRN BP meds for SBP >220    Diabetes mellitus type II, controlled  Stroke risk factor  On insulin and amaryl at home  A1C 8.7  D/C home regimen  SSI while in hospital, transition to basal insulin as needed  Recommending that patient follow up with PCP for diabetes management    Hyperlipidemia  Stroke risk factor  Continue home atorvastatin 40  Order lipid panel    Coronary artery disease  Stroke risk factor  Increase to aspirin 325  Continue home atorvastatin 40    Hemiparesis, left  UMN weakness on L  Possibly 2/2 stroke  Order MRI brain  Therapy to evaluate and treat    Facial droop  Possibly 2/2 stroke  Therapy to evaluate and treat    Angina at rest  intial presenting symptom  Cardiology consulted in ED and chest pain workup negative  troponins negative  EKG without evidence of MI    Bradycardia  asymptomatic          Jordana Moore,  GRAZYNA  [facility]  Department of Vascular Neurology   [facility]

## 2017-05-02 NOTE — ASSESSMENT & PLAN NOTE
Stroke risk factor  On insulin and amaryl at home  A1C 8.7  D/C home regimen  SSI while in hospital, transition to basal insulin as needed  Recommending that patient follow up with PCP for diabetes management

## 2017-05-02 NOTE — SUBJECTIVE & OBJECTIVE
Neurologic Chief Complaint: L facial droop    Subjective:     Interval History: Patient is seen for follow-up neurological assessment and treatment recommendations: HERNÁN, patient complains of intermittent CP, cardiac workup negative, asymptomatic bradycardia    HPI, Past Medical, Family, and Social History remains the same as documented in the initial encounter.     Review of Systems   Constitutional: Negative for chills, diaphoresis and fever.   Eyes: Negative for visual disturbance.   Respiratory: Negative for cough and shortness of breath.    Cardiovascular: Positive for chest pain. Negative for palpitations.   Gastrointestinal: Negative for nausea and vomiting.   Neurological: Positive for facial asymmetry. Negative for dizziness, speech difficulty, weakness, numbness and headaches.   Psychiatric/Behavioral: Negative for agitation and behavioral problems.     Scheduled Meds:   alprazolam  1 mg Oral QHS    aspirin  325 mg Oral Daily    atorvastatin  40 mg Oral Daily    enoxaparin  40 mg Subcutaneous Daily    fluticasone-vilanterol  1 puff Inhalation Daily    gabapentin  100 mg Oral Q8H    levobunolol  1 drop Both Eyes BID    levothyroxine  75 mcg Oral Before breakfast    sodium chloride 0.9%  3 mL Intravenous Q8H    trazodone  150 mg Oral Nightly     Continuous Infusions:   sodium chloride 0.9%       PRN Meds:albuterol, albuterol sulfate, dextrose 50%, dextrose 50%, glucagon (human recombinant), glucose, glucose, insulin aspart, sodium chloride 0.9%    Objective:     Vital Signs (Most Recent):  Temp: 97.3 °F (36.3 °C) (05/02/17 0400)  Pulse: (!) 50 (05/02/17 0842)  Resp: 14 (05/02/17 0842)  BP: (!) 120/53 (05/02/17 0400)  SpO2: 95 % (05/02/17 0842)  BP Location: Left arm    Vital Signs Range (Last 24H):  Temp:  [97.3 °F (36.3 °C)-98.9 °F (37.2 °C)]   Pulse:  [42-64]   Resp:  [13-20]   BP: (117-167)/(53-72)   SpO2:  [94 %-100 %]   BP Location: Left arm    Physical Exam   Constitutional: She is oriented  to person, place, and time. She appears well-developed and well-nourished. No distress.   HENT:   Head: Normocephalic and atraumatic.   Eyes: EOM are normal. Pupils are equal, round, and reactive to light.   Neck: Normal range of motion.   Cardiovascular: Bradycardia present.    Pulmonary/Chest: Effort normal. No respiratory distress.   Abdominal: Soft. She exhibits no distension.   Musculoskeletal: Normal range of motion.   Neurological: She is alert and oriented to person, place, and time.   Skin: Skin is warm and dry.   Psychiatric: She has a normal mood and affect. Her behavior is normal.   Vitals reviewed.      Neurological Exam:   LOC: alert and follows requests  Language: No aphasia  Speech: No dysarthria  Orientation: Person, Place, Time  Memory: Recent memory intact, Remote memory intact, Age correct, Month correct  Visual Fields (CN II): Full  EOM (CN III, IV, VI): Full/intact  Pupils (CN III, IV, VI): PERRL  Facial Sensation (CN V): Symmetric  Facial Movement (CN VII): lower weakness left lower  Hearing (CN VIII): intact bilaterally  Motor*: Arm Left: Paretic: 4/5, Leg Left: Paretic: 4/5, Arm Right: Normal (5/5), Leg Right: Normal (5/5) patient with UMN weakness on L  Cerebellar*: Normal limb, Normal gait , Normal stance  Sensation: intact to light touch, temperature and vibration  Tone: Arm-Left: normal; Leg-Left: normal; Arm-Right: normal; Leg-Right: normal    Stroke Team Times:   Last Known Normal Date and Time : 4/30/201723:00  Symptom Onset Date and Time:5/1/2017Time onset of stroke symptoms: unknown.    Stroke Team Called Date and Time: 5/1/201714:42  Stroke Team Arrived Date and Time: 5/1/201714:45  CT Interpretation Time: 15:00  Intervention decision time: none.  NIH Stroke Scale:  Interval: baseline (upon arrival/admit)  Level of Consciousness: 0 - alert  LOC Questions: 0 - answers both correctly  LOC Commands: 0 - performs both correctly  Best Gaze: 0 - normal  Visual: 0 - no visual loss  Facial  Palsy: 2 - partial  Motor Left Arm: 0 - no drift  Motor Right Arm: 0 - no drift  Motor Left Le - no drift  Motor Right Le - no drift  Limb Ataxia: 0 - absent  Sensory: 0 - normal  Best Language: 0 - no aphasia  Dysarthria: 0 - normal articulation  Extinction and Inattention: 0 - no neglect  NIH Stroke Scale Total: 2      Laboratory:  CMP:   Recent Labs  Lab 17   CALCIUM 9.0   ALBUMIN 3.2*   PROT 6.4      K 4.2   CO2 31*      BUN 13   CREATININE 1.0   ALKPHOS 70   ALT 11   AST 15   BILITOT 0.5     CBC:   Recent Labs  Lab 17   WBC 4.64   RBC 4.06   HGB 12.0   HCT 37.1      MCV 91   MCH 29.6   MCHC 32.3     Lipid Panel:   Recent Labs  Lab 17   CHOL 113*   LDLCALC 59.2*   HDL 35*   TRIG 94     Coagulation:   Recent Labs  Lab 17   INR 0.9   APTT 22.8     Platelet Aggregation Study: No results for input(s): PLTAGG, PLTAGINTERP, PLTAGREGLACO, ADPPLTAGGREG in the last 168 hours.  Hgb A1C: No results for input(s): HGBA1C in the last 168 hours.  TSH:   Recent Labs  Lab 17   TSH 1.312       Diagnostic Results:  I have personally reviewed:   Findings:     CT Head. Date: 17  -no acute intracranial abnormality    CTA Head/Neck. Date: 17  -Atherosclerosis of the bilateral carotid bulbs and proximal ICAs, right greater than left, with less than 50% stenosis bilaterally.  -no evidence of vascular occlusion or aneurysm     MRI Head. Date: 17  -no acute infarct    Echo. Date: 17  -EF 60% with diastolic dysfunction  -mild LAE  -concentric remodeling

## 2017-05-02 NOTE — PLAN OF CARE
Problem: SLP Goal  Goal: SLP Goal  Outcome: Outcome(s) achieved Date Met:  05/02/17     Swallow evaluation and speech language cognitive evaluation completed. No SLP fu rec'd.     Heather Arguello M.A. CCC-SLP  Speech Language Pathologist  [phone]  5/2/2017

## 2017-05-02 NOTE — CONSULTS
Strokeway Pathway consult met. See note from education from 5/2. Recommendation: Please add cardiac restriction to diet, continuing ADA 1800 juan diet.

## 2017-05-02 NOTE — PT/OT/SLP EVAL
"Physical Therapy  Evaluation/Discharge Summary    Nyasia Vazquez   MRN: [id number]   Admitting Diagnosis: Stroke    PT Received On: 17  PT Start Time: 815     PT Stop Time: 829    PT Total Time (min): 14 min       Billable Minutes:  Evaluation 14    Diagnosis: Stroke; facial droop    Past Medical History:   Diagnosis Date    Asthma     Diabetes mellitus     Hypertension       History reviewed. No pertinent surgical history.    Referring physician: Fabricio  Date referred to PT: 2017    General Precautions: Standard, aspiration, fall      Do you have any cultural, spiritual, Confucianist conflicts, given your current situation?: None noted    Patient History:  Lives With: alone, child(matthew), adult (Pt lives alone in [place]; lives with adult daughter when in [place])  Living Arrangements: apartment, house ([facility] with 0STE in [place]; 2nd floor apartment in [place])  Living Environment Comment: Pt lives alone in [place] and with her daughter when here in [place] (majority of time); pt's daughter's home has x1 flight of stairs to enter with B railings. PTA pt was (I) with mobility and ADLs; driving, but not working. Pt reports x2 falls outside the home on uneven terrain.  Equipment Currently Used at Home: none  DME owned (not currently used): none    Previous Level of Function:  Ambulation Skills: independent  Transfer Skills: independent  ADL Skills: independent  Work/Leisure Activity: independent    Subjective:  Communicated with RN (Malika) prior to session.    Chief Complaint: "I just noticed the face droop yesterday."  Patient goals: "I would like to do the OP. It would be good to work on my balance."    Pain Ratin/10  Pain Rating Post-Intervention: 0/10    Objective:   Patient found with: telemetry     Cognitive Exam:  Oriented to: Person, Place, Time and Situation    Follows Commands/attention: Follows multistep  commands  Communication: clear/fluent  Safety awareness/insight to disability: intact    Physical " Exam:  Postural examination/scapula alignment: Rounded shoulder and Head forward    Skin integrity: Visible skin intact and Dry  Edema: None noted    Sensation:   Intact ; upon evaluation and per pt report, no changes in sensation to B LE.    Lower Extremity Range of Motion:  Right Lower Extremity: WFL  Left Lower Extremity: WFL    Lower Extremity Strength:  Right Lower Extremity: WNL  Left Lower Extremity: WNL    Gross motor coordination: WFL    Functional Mobility:  Bed Mobility:  Rolling/Turning Right: Independent  Scooting/Bridging: Independent  Supine to Sit: Contact Guard Assistance (Pt reaching for PT to assist to EOB)  Sit to Supine:  (pt remained sitting EOB; daughter present)    Transfers:  Sit <> Stand Assistance: Supervision, Independent (from EOB; from bedside chair; x3 instances total)  Sit <> Stand Assistive Device: No Assistive Device  Bed <> Chair Technique: Stand Pivot  Bed <> Chair Assistance: Supervision  Bed <> Chair Assistive Device: No Assistive Device    Gait:   Gait Distance: x200 feet in busy hallway setting; no LOB or instability noted. Pt demo good safety awareness and ability to maintain conversation during gait assessment.  Assistance 1: Supervision  Gait Assistive Device: No device  Gait Pattern: reciprocal  Gait Deviation(s): decreased minh (none)    Stairs:  Pt ascended/descend 1 flight of stair(s) with No Assistive Device with left rail with Supervision from PT. Alternating step pattern to ascend; step-to step pattern to descend. No LOB or instability noted; pt demo good safety awareness.    Balance:   Static Sit: GOOD-: Takes MODERATE challenges from all directions but inconsistently  Dynamic Sit: GOOD-: Maintains balance through MODERATE excursions of active trunk movement,     Static Stand: GOOD: Takes MODERATE challenges from all directions  Dynamic stand: GOOD: Needs SUPERVISION only during gait and able to self right with moderate LOB    Therapeutic Activities and  Exercises:  PT arrived to pt's room to find pt resting comfortably with daughter at bedside. Pt agreeable to PT Eval and hallway gait assessment as above. Upon return to room, pt and PT discussed recommendations, stroke s/s, mobility needs and fall risk. Questions/concerns addressed within PT scope of practice.     AM-PAC 6 CLICK MOBILITY  How much help from another person does this patient currently need?   1 = Unable, Total/Dependent Assistance  2 = A lot, Maximum/Moderate Assistance  3 = A little, Minimum/Contact Guard/Supervision  4 = None, Modified Towner/Independent    Turning over in bed (including adjusting bedclothes, sheets and blankets)?: 4  Sitting down on and standing up from a chair with arms (e.g., wheelchair, bedside commode, etc.): 4  Moving from lying on back to sitting on the side of the bed?: 3  Moving to and from a bed to a chair (including a wheelchair)?: 4  Need to walk in hospital room?: 4  Climbing 3-5 steps with a railing?: 4  Total Score: 23     AM-PAC Raw Score CMS G-Code Modifier Level of Impairment Assistance   6 % Total / Unable   7 - 9 CM 80 - 100% Maximal Assist   10 - 14 CL 60 - 80% Moderate Assist   15 - 19 CK 40 - 60% Moderate Assist   20 - 22 CJ 20 - 40% Minimal Assist   23 CI 1-20% SBA / CGA   24 CH 0% Independent/ Mod I     Patient left sitting EOB with daughter present with all lines intact, call button in reach and RN aware.    Assessment:   Nyasia Vazquez is a 66 y.o. female with a medical diagnosis of stroke and facial droop. PTA pt was (I) with mobility and ADLs; driving, but not working. Upon evaluation, pt presents with facial droop. Pt is (I) with bed mobility and req supervision for gait in room and hallway/stairs. Pt demo no Acute PT needs; however, will benefit from OP PT to address high level balance.. Patient will benefit from continued PT services to address:    Rehab identified problem list/impairments: Rehab identified problem  list/impairments: impaired endurance, impaired balance    Rehab potential is good.    Activity tolerance: Good    Discharge recommendations: Discharge Facility/Level Of Care Needs: outpatient PT     Barriers to discharge: Barriers to Discharge: None    Equipment recommendations: Equipment Needed After Discharge: none     PLAN:    D/C Acute PT services 5/2/2017. OP PT recommended for high level balance.  Plan of Care reviewed with: patient, daughter     Ayanna TORRES Anupama, PT, DPT  [phone]  5/2/2017

## 2017-05-02 NOTE — DISCHARGE SUMMARY
[facility]  Vascular Neurology  Comprehensive Stroke Center  Discharge Summary     Summary:     Admit Date: 5/1/2017 12:35 PM    Discharge Date and Time: No discharge date for patient encounter.    Attending Physician: Kirt Regalado MD     Discharge Provider: Jordana Moore PA-C    History of Present Illness: Nyasia Vazquez is a 66 y.o. Female PMHx of DM, arthritis, asthma, HTN, HLD, and CAD who presented to the ED with chest pain and diaphoresis. On exam, ED doctor noted that the patient had a L facial droop and consults stroke team for further evaluation.    On arrival, patient complained of intermittent chest pain. She denied weakness, numbness, difficulty speaking, vision changes, nausea, and vomiting. Patient examined her reflection and noted that her face was asymmetric. She is unsure of when the facial droop began. Patient did not notice any facial asymmetry until asked to look at her reflection. Last known normal was last night at 11pm when she went to sleep.      Hospital Course (synopsis of major diagnoses, care, treatment, and services provided during the course of the hospital stay): Nyasia Vazquez is a 66 y.o. Female with a PMHx of DM, arthritis, asthma, HTN, DM, and CAD who presented to the hospital with chest pain and diaphoresis. ED physician noted L facial droop and contacted stroke team. CT head showed no intracranial abnormalities. Patient did not notice facial droop until it was pointed out by the ED physician. Ms. Vazquez stated that her face was even when went to sleep at 11pm the previous night. Therefore, she was outside of the window for tPA. Given her symptoms, it is unlikely that she was having a large vessel occlusion so she was not a candidate for thrombectomy. On exam, patient had UMN weakness in L arm and leg in addition to her L facial droop. She was admitted to stroke service for a complete stroke workup.    CTA head and neck revealed  atherosclerosis of the bilateral carotid bulbs and proximal ICAs, right greater than left, with less than 50% stenosis bilaterally, but no evidence of vascular occlusion or aneurysm. Her MRI was negative for acute stroke. Patient's echo revealed an EF of 60% with diastolic dysfunction, mild LA enlargement, and concentric remodeling. Given her persistent facial droop and UMN weakness, it is likely that Ms. Vazquez experienced an small stroke that was not detectable on MRI. The etiology is likely small vessel disease.     While in ED, cardiology was consulted for her chest pain and they did not recommend any further cardiac workup. Her troponins were WNLs and her EKG was negative for ischemic changes. She had persistent asymptomatic bradycardia during her stay, but never experienced any signs or symptoms of bradycardia. Her A1C was noted to be 8.7 and it was recommended that she follow up with her PCP for diabetes management.    She was evaluated by PT/OT and speech who recommended outpatient PT. For secondary stroke prevention, she will take aspirin 325 and atorvastatin 40. She will resume all other home medications. Ms. Vazquez will be discharge home with outpatient PT and follow up in stroke clinic in 4-6 weeks and with her PCP in 1 week.        NIH Stroke Scale:  Interval: 7 days or at discharge (whichever comes first)  Level of Consciousness: 0 - alert  LOC Questions: 0 - answers both correctly  LOC Commands: 0 - performs both correctly  Best Gaze: 0 - normal  Visual: 0 - no visual loss  Facial Palsy: 2 - partial  Motor Left Arm: 0 - no drift  Motor Right Arm: 0 - no drift  Motor Left Le - no drift  Motor Right Le - no drift  Limb Ataxia: 0 - absent  Sensory: 0 - normal  Best Language: 0 - no aphasia  Dysarthria: 0 - normal articulation  Extinction and Inattention: 0 - no neglect  NIH Stroke Scale Total: 2  Modified Bartholomew Scale:   Timeline: Prior to symptoms onset  Modified Bartholomew Score: 1 - no significant  disability          Assessment/Plan:     Interventions: None    Complications: None    Research Candidate?:  No    Neurological deficit at discharge: Weakness: left, L facial droop     Disposition: Home or Self Care    Final Active Diagnoses:    Diagnosis Date Noted POA    PRINCIPAL PROBLEM:  Thrombotic stroke involving right middle cerebral artery [I63.311] 05/01/2017 Unknown    Hypertension [I10] 05/01/2017 Unknown    Diabetes mellitus type II, controlled [E11.9] 05/01/2017 Unknown    Hyperlipidemia [E78.5] 05/01/2017 Unknown    Bradycardia [R00.1] 05/02/2017 Unknown    Coronary artery disease [I25.10] 05/01/2017 Unknown    Hemiparesis, left [G81.94] 05/01/2017 Unknown    Facial droop [R29.810] 05/01/2017 Unknown    Angina at rest [I20.8] 05/01/2017 Yes      Problems Resolved During this Admission:    Diagnosis Date Noted Date Resolved POA     * Thrombotic stroke involving right middle cerebral artery  Nyasia Vazquez is a 66 y.o. female with L facial droop and UMN weakness on L. CT negative for acute abnormalities. CTA negative for significant vascular stenosis or occlusion. MRI negative for acute stroke. Given patient's persisting symptoms, it's possible that she could have a very small MRI negative stroke.    Antithrombotics for secondary stroke prevention: Antiplatelets:  Aspirin: 325 mg oral now and daily, Statins for secondary stroke prevention and hyperlipidemia, if present: Atorvastatin- 40 mg oral daily, Aggressive risk factor modification: Hypertension, Diabetes, High Cholesterol and Carotid Artery disease, Rehab Efforts: Physical Therapy, Occupational Therapy and Speech and Language Pathology, Diagnostics: Ordered/Pending none,  VTE Prophylaxis: Enoxaparin 40 mg SQ every 24 hours, BP Parameters: SBP <220, Nursing Orders: Neuro checks- Q4H, Complete ANDRA unless evaluated by speech, Out of bed BID, Avoid Rucker catheter, Pneumatic compression device, Stroke Education, Blood glucose target  100-130, Up with assistance and IV Fluids: NS @ 75cc/hr                                Hypertension  Stroke risk factor  Patient will resume all home BP medications at discharge    Diabetes mellitus type II, controlled  Stroke risk factor  On insulin and amaryl at home  A1C 8.7  SSI while in hospital  Recommending that patient follow up with PCP for diabetes management    Hyperlipidemia  Stroke risk factor  Continue home atorvastatin 40      Coronary artery disease  Stroke risk factor  Increase to aspirin 325  Continue home atorvastatin 40    Hemiparesis, left  UMN weakness on L  Therapy recommending outpatient PT    Facial droop  Therapy to evaluate and treat    Angina at rest  intial presenting symptom  Cardiology consulted in ED and chest pain workup negative  troponins negative  EKG without evidence of MI    Bradycardia  Asymptomatic  Avoid beta blockers      Recommendations:     Post-discharge complication risks: None    Stroke Education given to: patient    Follow-up in Stroke Clinic in 4-6 weeks    Discharge Plan:  Antithrombotics: Aspirin 325mg  Statin: Atorvastatin 40mg  Aggresive risk factor modification:  Hypertension, Diabetes, High Cholesterol and Carotid Artery disease    Follow Up:  Follow-up Information     Follow up with Jordana Lawson PA-C On 6/20/2017.    Specialty:  Neurology    Why:  @ 10:20    Contact information:    [facility] [facility] [id number]  [phone]          Follow up with Dr. Raza Miller DO In 1 week.    Why:  Diabetes management, A1C 8.7    Contact information:    [facility]        Patient Instructions:     Diet Cardiac   Order Comments: See Stroke Patient Education Guide Booklet for details.   Scheduling Instructions: Diabetic diet     Call 911 for any of the following:   Order Comments: Call 911  right away if any of the following warning signs come on suddenly, even if the symptoms only last for a few minutes. With stroke, timing is very  important.   - Warning Signs of Stroke:  - Weakness: You may feel a sudden weakness, tingling or loss of feeling on one side of your face or body.  - Vision Problems: You may have sudden double vision or trouble seeing in one or both eyes.  - Speech Problems: You may have sudden trouble talking, slured speech, or problems understanding others.  - Headache: You may have sudden, severe headache.  - Movement Problems: You may experience dizziness, a feeling of spinning, a loss of balance, a feeling of falling or blackouts.     Activity as tolerated       Medications:  Reconciled Home Medications:   Current Discharge Medication List      START taking these medications    Details   aspirin 325 MG tablet Take 1 tablet (325 mg total) by mouth once daily.  Refills: 0         CONTINUE these medications which have NOT CHANGED    Details   albuterol (PROVENTIL) 2.5 mg /3 mL (0.083 %) nebulizer solution Take 2.5 mg by nebulization every 4 (four) hours as needed for Wheezing. Rescue      albuterol 90 mcg/actuation inhaler Inhale 2 puffs into the lungs every 6 (six) hours as needed for Wheezing or Shortness of Breath. Rescue      alprazolam (XANAX) 1 MG tablet Take 1 mg by mouth every evening.       amlodipine-benazepril 5-10 mg (LOTREL) 5-10 mg per capsule Take 1 capsule by mouth once daily.      atorvastatin (LIPITOR) 40 MG tablet Take 40 mg by mouth once daily.      blood sugar diagnostic (BLOOD GLUCOSE TEST) Strp by Misc.(Non-Drug; Combo Route) route.      budesonide-formoterol 160-4.5 mcg (SYMBICORT) 160-4.5 mcg/actuation HFAA Inhale 2 puffs into the lungs every 12 (twelve) hours. Controller      fish oil-omega-3 fatty acids 300-1,000 mg capsule Take 2 g by mouth once daily.      gabapentin (NEURONTIN) 100 MG capsule Take 1 capsule (100 mg total) by mouth every 8 (eight) hours.  Qty: 30 capsule, Refills: 11      glimepiride (AMARYL) 4 MG tablet Take 4 mg by mouth 2 (two) times daily.       hydrochlorothiazide (HYDRODIURIL)  25 MG tablet Take 25 mg by mouth once daily.      insulin glargine (LANTUS) 100 unit/mL injection Inject 30 Units into the skin every evening.      insulin NPH-insulin regular, 70/30, (NOVOLIN 70/30) 100 unit/mL (70-30) injection Inject into the skin 2 (two) times daily.      levothyroxine (SYNTHROID) 75 MCG tablet Take 75 mcg by mouth once daily.      losartan (COZAAR) 100 MG tablet Take 100 mg by mouth once daily.      meclizine (ANTIVERT) 32 MG tablet Take 25 mg by mouth 3 (three) times daily as needed.      oxybutynin (DITROPAN-XL) 5 MG TR24 Take 5 mg by mouth once daily.      timolol 0.5 % ophthalmic solution Place 1 drop into both eyes 2 (two) times daily.      trazodone (DESYREL) 150 MG tablet Take 150 mg by mouth nightly.         STOP taking these medications       aspirin 81 MG Chew Comments:   Reason for Stopping:                   Jordana Moore PA-C  Comprehensive Stroke Center  Department of Vascular Neurology   [facility]

## 2017-05-02 NOTE — PLAN OF CARE
"PATHWAY - IP ISCHEMIC STROKE - [facility]      Step 1       RD: Dietary eval completed within 48 hours of admit Met          RD provided cardiac dietary education and diabetes education. RD covered basics for "bad" v "good" fats and the various foods patient can choose.  Discussed with pt low-sodium choices and not adding salt to foods. Set goal with patient to start using more herbs/spices v. Salt to flavor foods. Discussed exercise and increasing physical activity such as taking short walks a few times/week to help with strength and increasing HDL cholesterol.   Re: diabetic education, RD discussed with patient CHO consistency, foods that affect BG, portion sizing, label reading, and carb counting.   Pt very interested in learning and verbalized understanding.   Good appetite noted.      "

## 2017-05-02 NOTE — PROGRESS NOTES
Consent obtained. optison given ivp via right forearm sl for imaging. Denies transfusion rxn. Tolerated well. Sl flushed before and after with ns.

## 2017-05-02 NOTE — SUBJECTIVE & OBJECTIVE
NIH Stroke Scale:  Interval: 7 days or at discharge (whichever comes first)  Level of Consciousness: 0 - alert  LOC Questions: 0 - answers both correctly  LOC Commands: 0 - performs both correctly  Best Gaze: 0 - normal  Visual: 0 - no visual loss  Facial Palsy: 2 - partial  Motor Left Arm: 0 - no drift  Motor Right Arm: 0 - no drift  Motor Left Le - no drift  Motor Right Le - no drift  Limb Ataxia: 0 - absent  Sensory: 0 - normal  Best Language: 0 - no aphasia  Dysarthria: 0 - normal articulation  Extinction and Inattention: 0 - no neglect  NIH Stroke Scale Total: 2  Modified Jeevan Scale:   Timeline: Prior to symptoms onset  Modified Deuel Score: 1 - no significant disability

## 2017-05-02 NOTE — PT/OT/SLP EVAL
"Occupational Therapy  Evaluation/Discharge    Nyasia Vazquez   MRN: [id number]   Admitting Diagnosis: Thrombotic stroke involving right middle cerebral artery    OT Date of Treatment: 17   OT Start Time: 1054  OT Stop Time: 1109  OT Total Time (min): 15 min    Billable Minutes:  Evaluation 15    Diagnosis: Thrombotic stroke involving right middle cerebral artery   L facial droop    Past Medical History:   Diagnosis Date    Asthma     Diabetes mellitus     Hypertension       History reviewed. No pertinent surgical history.    Referring physician: Fabricio  Date referred to OT: 2017    General Precautions: Standard, aspiration, fall, diabetic  Orthopedic Precautions: N/A  Braces: N/A    Patient History:  Living Environment  Living Environment Comment: Pt lives alone in [place], [place]; however, frequently stays with adult daughter in the [facility]. Daughter's home has 1 flight of stairs for enterance. Tub/shower present in both homes. Pt is still driving and is active in community. She wears reading glasses. She is still completing stove top meals for family and friends. She reports "a few falls" in past few months at home.   Equipment Currently Used at Home: none    Prior level of function:   Bed Mobility/Transfers: independent  Grooming: independent  Bathing: independent  Upper Body Dressing: independent  Lower Body Dressing: independent  Toileting: independent  Home Management Skills: independent  Homemaking Responsibilities: Yes  Driving License: Yes  Occupation: Retired  Leisure and Hobbies: Gardening and being in her yard   Dominant hand: right    Subjective:  Communicated with RN prior to session.  Pt's daughter present at bedside. Pt reported "I'm feeling better than I did yesterday"  Chief Complaint: none reported  Patient/Family stated goals: home soon    Pain Ratin/10  Pain Rating Post-Intervention: 0/10    Objective:  Patient found with: telemetry    Cognitive Exam:  Oriented to: " Person, Place, Time and Situation  Follows Commands/attention: Follows multistep  commands  Communication: clear/fluent  Memory:  No Deficits noted  Safety awareness/insight to disability: intact  Coping skills/emotional control: Appropriate to situation    Visual/perceptual:  Intact  tracking, saccades and motor planning praxis    Physical Exam:  Postural examination/scapula alignment: Rounded shoulder and Posterior pelvic tilt  Skin integrity: Visible skin intact  Edema: None noted     Sensation:   Intact  light/touch B UE    Upper Extremity Range of Motion:  Right Upper Extremity: WFL  Left Upper Extremity: WFL    Upper Extremity Strength:  Right Upper Extremity: WFL  Left Upper Extremity: WFL   Strength: WFL B    Fine motor coordination:   Intact  Left hand, finger to nose, Right hand, finger to nose, Left hand thumb/finger opposition skills, Right hand thumb/finger opposition skills, Left hand, manipulation of objects and Right hand, manipulation of objects    Gross motor coordination: WFL for B UE    Functional Mobility:  Bed Mobility:  Rolling/Turning Right: Independent  Scooting/Bridging: Independent  Supine to Sit: Independent  Sit to Supine: Independent    Transfers:  Sit <> Stand Assistance: Independent  Sit <> Stand Assistive Device: No Assistive Device  Bed <> Chair Technique: Stand Pivot  Bed <> Chair Transfer Assistance: Supervision  Bed <> Chair Assistive Device: No Assistive Device  Shower Transfer Technique: Stand Pivot  Shower Transfer Assistance: Supervision  Shower Transfer Assistive Device: Grab bar (used with and without for enterance into step in shower)    Functional Ambulation: Supervision within room    Activities of Daily Living:  Feeding Level of Assistance: Independent, Set-up Assistance  UE Dressing Level of Assistance: Independent, Set-up Assistance  LE Dressing Level of Assistance: Independent, Set-up Assistance  Grooming Position: Standing at sink  Grooming Level of Assistance:  "Independent    Balance:   Static Sit: NORMAL: No deviations seen in posture held statically  Dynamic Sit: NORMAL: No deviations seen in posture held dynamically  Static Stand: GOOD: Takes MODERATE challenges from all directions  Dynamic stand: GOOD: Needs SUPERVISION only during gait and able to self right with moderate     Additional:  -Pt and daughter edu on OT role in care  -Communication board updated; pt reported understanding for staff/family assistance at this time    Modified Jeevan Score: 0 - no symptoms    AM-PAC 6 CLICK ADL  How much help from another person does this patient currently need?  1 = Unable, Total/Dependent Assistance  2 = A lot, Maximum/Moderate Assistance  3 = A little, Minimum/Contact Guard/Supervision  4 = None, Modified [place]/Independent    Putting on and taking off regular lower body clothing? : 4  Bathing (including washing, rinsing, drying)?: 4  Toileting, which includes using toilet, bedpan, or urinal? : 4  Putting on and taking off regular upper body clothing?: 4  Taking care of personal grooming such as brushing teeth?: 4  Eating meals?: 4  Total Score: 24    AM-PAC Raw Score CMS "G-Code Modifier Level of Impairment Assistance   6 % Total / Unable   7 - 9 CM 80 - 100% Maximal Assist   10 - 14 CL 60 - 80% Moderate Assist   15 - 19 CK 40 - 60% Moderate Assist   20 - 22 CJ 20 - 40% Minimal Assist   23 CI 1-20% SBA / CGA   24 CH 0% Independent/ Mod I       Patient left seated EOB with call button in reach and daughter present    Assessment:  Nyasia Vazquez is a 66 y.o. female with a medical diagnosis of Thrombotic stroke involving right middle cerebral artery and presents with mild L facial droop. She demo good functional mobility and functional indep with daily tasks and ADLs. Pt reported falls at home and fear of fall in bathroom (discussed grab bars and safety in bathroom). Pt with no need for follow up OT at this time.    Rehab identified problem " list/impairments: Rehab identified problem list/impairments: impaired balance    Rehab potential is good.    Activity tolerance: Good    Discharge recommendations: Discharge Facility/Level Of Care Needs: home (with daughter assistance as needed)     Barriers to discharge: Barriers to Discharge: None    Equipment recommendations: grab bar       PLAN:  Patient with no skilled OT needs at this time. Pt would benefit from supervision of family for functional mobility/to bathroom at this time. Communication board updated.   Plan of Care reviewed with: patient, daughter      YOMI Aponte  05/02/2017

## 2017-05-02 NOTE — CONSULTS
PM&R consult received.    Reason for consult:  assess rehabilitation needs    Reviewed patient history and current admission.      Evaluated by therapy.  Patient is supervision/independent with mobility, transfers, and functional ambulation (ambulated 200 ft SV).  PT discharged from service.  Patient near prior level of function and with limited rehab goals.  Will sign off.  Please call with questions/concerns or re-consult if situation changes.    FLORES Lora, FNP-C  Physical Medicine & Rehabilitation   05/02/2017  Spectralink: [id number]

## 2017-05-02 NOTE — PT/OT/SLP EVAL
Speech Language Pathology Evaluation/Discharge    Nyasia Vazquez   MRN: [id number]   Admitting Diagnosis: Thrombotic stroke involving right middle cerebral artery    Diet recommendations: Solid Diet Level: Regular  Liquid Diet Level: Thin     SLP Treatment Date: 17  Speech Start Time: 1405     Speech Stop Time: 1422     Speech Total (min): 17 min       TREATMENT BILLABLE MINUTES:  Eval 8  and Eval Swallow and Oral Function 9    Diagnosis: Thrombotic stroke involving right middle cerebral artery      Past Medical History:   Diagnosis Date    Asthma     Diabetes mellitus     Hypertension      History reviewed. No pertinent surgical history.    Has the patient been evaluated by SLP for swallowing? : Yes  Keep patient NPO?: No   General Precautions: Standard,            Social Hx: Patient lives alone in [place].    Prior diet: no reported restrictions    Occupational/hobbies/homemaking: Pt is not working and had not previous jobs. 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 She enjoys gardening.    Subjective:  Pt awake; pleasant  Patient goals: to continue to garden    Pain Ratin/10              Pain Rating Post-Intervention: 0/10    Objective:        Oral Musculature Evaluation  Oral Musculature: left weakness (very mild)  Dentition: present and adequate  Mucosal Quality: good  Mandibular Strength and Mobility: WNL  Oral Labial Strength and Mobility: WFL  Lingual Strength and Mobility: WNL  Velar Elevation: WNL  Buccal Strength and Mobility: WFL  Volitional Swallow: able to demonstrae  Voice Prior to PO Intake: clear     Cognitive Status:  Behavioral Observations: alert and appropriate-  Orientation, mental manipulation (with self correction), sequencing, immediate recall, convergent categorization, complex problem solvin% acc indep   Delayed recall: 66% acc indep  Complex reasonin% acc indep  All cognitive tasks completed with mild increased processing time  Attention:  WNL      Language:   Receptive Language:  LR discrimination, complex YN questions, follow 1-3 step commands, paragraph comprehension, identified object FC 3: 100% acc indep    Expressive Language:  Sentence completion, rote greetings, naming objects, basic conversation: 100% acc indep     Motor Speech: No evidence of impairment      Voice: No evidence of impairment    Reading: read aloud and identified 2 sentences: 100% acc indep     Writing: wrote name to dictation using dominant hand: 100% acc indep     Visual-Spatial: copied 3D object with 25% acc indep    Bedside Swallow Eval:  Consistencies Assessed: Thin liquids 3 straw sips, Puree 3 full spoonfuls and Solids 1/2 nichelle cracker  Oral Phase: WNL  Pharyngeal Phase: no overt clinical  signs/symptoms of aspiration and no overt clinical signs/symptoms of pharyngeal dysphagia    Additional Treatment:    Skilled education provided on aspiration precautions and diet recommendations. Whiteboard updated with diet recommendations/aspiration precautions. Pt states cognitive-linguistic baseline. No further questions.      FIM:  Social Interaction: 7 Complete [place]--The patient interacts appropriately with staff, other patients, and family members (e.g., controls temper, accepts criticism, is aware that words and actions have an impact on others), and does not require medication for   Problem Solvin Modified [place]--In most situations, the patient recognizes a present problem, and with only mild difficulty makes appropriate decisions, initiates and carries out a sequence of steps to solve complex problems, or requires more than a   Comprehension: 7 Complete [place]--The patient understand complex or abstract directions and conversation, and understand either spoken or written language (not necessarily English).   Expression: 7 Complete [place]--The patient expresses complex or abstract ideas clearly and fluently (not necessarily in English).    Memory: 6 Modified [place]--The patient appears to have only mild difficulty recognizing people frequently encountered, remembering daily routines, and responding to requests of others.  The patient may use  self-initiated or environmental cues, prompts,     Assessment:  Nyasia Vazquez is a 66 y.o. female with a SLP diagnosis of baseline cognitive-linguistic and swallow skills.    Do you have any cultural, spiritual, Anglican conflicts, given your current situation?: no    Discharge recommendations: Discharge Facility/Level Of Care Needs: home     Goals:   NA    Plan:   Plan of Care reviewed with: patient  SLP Follow-up?: No              Heather Arguello M.A. CCC-SLP  Speech Language Pathologist  [phone]  5/2/2017

## 2017-05-02 NOTE — ASSESSMENT & PLAN NOTE
Nyasia Vazquez is a 66 y.o. female with L facial droop and UMN weakness on L. CT negative for acute abnormalities. CTA negative for significant vascular stenosis or occlusion. MRI negative for acute stroke. Given patient's persisting symptoms, it's possible that she could have a very small MRI negative stroke.    Antithrombotics for secondary stroke prevention: Antiplatelets:  Aspirin: 325 mg oral now and daily, Statins for secondary stroke prevention and hyperlipidemia, if present: Atorvastatin- 40 mg oral daily, Aggressive risk factor modification: Hypertension, Diabetes, High Cholesterol and Carotid Artery disease, Rehab Efforts: Physical Therapy, Occupational Therapy and Speech and Language Pathology, Diagnostics: Ordered/Pending none,  VTE Prophylaxis: Enoxaparin 40 mg SQ every 24 hours, BP Parameters: SBP <220, Nursing Orders: Neuro checks- Q4H, Complete ANDRA unless evaluated by speech, Out of bed BID, Avoid Rucker catheter, Pneumatic compression device, Stroke Education, Blood glucose target 100-130, Up with assistance and IV Fluids: NS @ 75cc/hr

## 2017-05-02 NOTE — ASSESSMENT & PLAN NOTE
Nyasia Vazquez is a 66 y.o. female with L facial droop and UMN weakness on L. CT negative for acute abnormalities. CTA negative for significant vascular stenosis or occlusion. MRI negative for acute stroke. Given patient's persisting symptoms, it's possible that she could have a very small MRI negative stroke.    Antithrombotics for secondary stroke prevention: Antiplatelets:  Aspirin: 325 mg oral now and daily, Statins for secondary stroke prevention and hyperlipidemia, if present: Atorvastatin- 40 mg oral daily, Aggressive risk factor modification: Hypertension, Diabetes, High Cholesterol and Carotid Artery disease, Rehab Efforts: Physical Therapy, Occupational Therapy and Speech and Language Pathology, Diagnostics: Ordered/Pending HgbA1C to assess blood glucose levels,  VTE Prophylaxis: Enoxaparin 40 mg SQ every 24 hours, BP Parameters: SBP <220, Nursing Orders: Neuro checks- Q4H, Complete ANDRA unless evaluated by speech, Out of bed BID, Avoid Rucker catheter, Pneumatic compression device, Stroke Education, Blood glucose target 100-130, Up with assistance and IV Fluids: NS @ 75cc/hr

## 2017-05-02 NOTE — PLAN OF CARE
Problem: Occupational Therapy Goal  Goal: Occupational Therapy Goal  Outcome: Outcome(s) achieved Date Met:  05/02/17  Pt with no OT needs at this time. Rec home with family assistance as needed. Would benefit from install of grab bar in home shower. YOMI Aponte 5/2/2017

## 2017-05-02 NOTE — ASSESSMENT & PLAN NOTE
Stroke risk factor  On insulin and amaryl at home  A1C 8.7  SSI while in hospital  Recommending that patient follow up with PCP for diabetes management

## 2017-05-02 NOTE — PLAN OF CARE
Problem: Physical Therapy Goal  Goal: Physical Therapy Goal  Outcome: Outcome(s) achieved Date Met:  05/02/17     PT Evaluation complete. Recommending OP PT to address high level balance upon D/C. Pt reports baseline mobility and demo no further needs for Acute PT - ambulating in room with supervision only. D/C Acute PT services 5/2/2017.     Ayanna Altman, PT, DPT  [phone]  10/5/2016

## 2017-05-03 ENCOUNTER — TELEPHONE (OUTPATIENT)
Dept: NEUROSURGERY | Facility: HOSPITAL | Age: 67
End: 2017-05-03

## 2017-05-03 NOTE — TELEPHONE ENCOUNTER
Called number on file.  Spoke with patient.  Risk factors specific to patient for stroke discussed with teach back implemented.  Patient verbalized understanding of discharge instructions and medications.  Patient was asked about discharge appointments and follow up care.  Follow appointment scheduled for 6/20/2017 at 1020. Warning sings discussed with teach back discussion method implemented.  Notified to seek immediate medical help via 911 if new or worsening stroke symptoms occur.  Patient relayed no new questions or comments at this time.  Instructed to call [facility] with any further questions.

## 2017-05-04 ENCOUNTER — PATIENT OUTREACH (OUTPATIENT)
Dept: ADMINISTRATIVE | Facility: CLINIC | Age: 67
End: 2017-05-04
Payer: MEDICARE

## 2017-05-04 ENCOUNTER — NURSE TRIAGE (OUTPATIENT)
Dept: ADMINISTRATIVE | Facility: CLINIC | Age: 67
End: 2017-05-04

## 2017-05-04 NOTE — TELEPHONE ENCOUNTER
"  Reason for Disposition   MODERATE weakness (i.e., interferes with work, school, normal activities) and persists > 3 days    Protocols used: ST WEAKNESS (GENERALIZED) AND FATIGUE-A-OH    Spoke with patient following a TCC call with Vicky Zapata LPN for complaints of left side weakness.  Pt was recently discharged from hospital with "thrombotic stroke involving the right middle cerebral artery - left hemiparesis".  She has noticed that she is weak on the left side, but it does not interfere with her ADLs. She denies speech slurring, vision changes, mouth/face drooping.  She is thinking that she may need some PT or assistive device for ambulation.  She is able to walk, but is leaning to the left side.  She was seen yesterday by PCP office and was instructed to call and be seen again today for an evaluation. Patient indicates that she will follow this disposition.    "

## 2017-05-10 PROBLEM — R00.1 BRADYCARDIA: Status: ACTIVE | Noted: 2017-05-10

## 2017-05-10 PROBLEM — I63.311 THROMBOTIC STROKE INVOLVING RIGHT MIDDLE CEREBRAL ARTERY: Status: ACTIVE | Noted: 2017-05-10

## 2018-02-16 NOTE — ED NOTES
Bed: 01  Expected date:   Expected time:   Means of arrival:   Comments:  
MD is at the bedside.   
Patient connected to portable tele box. War room called and notified to monitor patient on tele box during transport. War room verified they can see patient on monitor.   
Patient identifiers verified and correct for Nyasia Vazquez.   LOC: The patient is awake, alert and aware of environment with an appropriate affect, the patient is oriented x 3 and speaking appropriately.   APPEARANCE: Patient appears comfortable and in no acute distress, patient is clean and well groomed.  SKIN: The skin is warm and dry, color consistent with ethnicity, patient has normal skin turgor and moist mucus membranes, skin intact, no breakdown or bruising noted.   MUSCULOSKELETAL: Patient moving all extremities spontaneously, no swelling noted.  RESPIRATORY: Airway is open and patent, respirations are spontaneous, patient has a normal effort and rate, no accessory muscle use noted, pt placed on continuous pulse ox with O2 sats noted at 97% on room air.  CARDIAC: Pt placed on cardiac monitor. Patient has a normal rate and regular rhythm, no edema noted, capillary refill < 3 seconds. Pt reports 10/10 chest pain x1 day.  GASTRO: Soft and non tender to palpation, no distention noted, normoactive bowel sounds present in all four quadrants. Pt states bowel movements have been regular.  : Pt denies any pain or frequency with urination.  NEURO: Pt opens eyes spontaneously, behavior appropriate to situation, follows commands, facial expression symmetrical, bilateral hand grasp equal and even, purposeful motor response noted, normal sensation in all extremities when touched with a finger.    
Pt placed on cardiac pads - family at bedside - patient denies pain at present - no dizziness at present, but has intermittent complaints of dizziness. Awake and alert in no acute distresss  
Respiratory

## 2018-04-23 ENCOUNTER — HOSPITAL ENCOUNTER (EMERGENCY)
Facility: HOSPITAL | Age: 68
Discharge: HOME | End: 2018-04-23
Attending: FAMILY MEDICINE
Payer: MEDICARE

## 2018-04-23 VITALS
RESPIRATION RATE: 20 BRPM | OXYGEN SATURATION: 98 % | BODY MASS INDEX: 32.47 KG/M2 | HEART RATE: 64 BPM | HEIGHT: 66 IN | SYSTOLIC BLOOD PRESSURE: 126 MMHG | DIASTOLIC BLOOD PRESSURE: 58 MMHG | TEMPERATURE: 99 F | WEIGHT: 202 LBS

## 2018-04-23 DIAGNOSIS — R07.9 CHEST PAIN: Primary | ICD-10-CM

## 2018-04-23 LAB
ALBUMIN SERPL BCP-MCNC: 3.3 G/DL
ALP SERPL-CCNC: 105 U/L
ALT SERPL W/O P-5'-P-CCNC: 11 U/L
ANION GAP SERPL CALC-SCNC: 7 MMOL/L
AST SERPL-CCNC: 13 U/L
BASOPHILS # BLD AUTO: 0.06 K/UL
BASOPHILS NFR BLD: 1 %
BILIRUB SERPL-MCNC: 0.5 MG/DL
BNP SERPL-MCNC: 27 PG/ML
BUN SERPL-MCNC: 15 MG/DL
CALCIUM SERPL-MCNC: 9.6 MG/DL
CHLORIDE SERPL-SCNC: 102 MMOL/L
CO2 SERPL-SCNC: 30 MMOL/L
CREAT SERPL-MCNC: 0.9 MG/DL
DIFFERENTIAL METHOD: ABNORMAL
EOSINOPHIL # BLD AUTO: 0.1 K/UL
EOSINOPHIL NFR BLD: 2.3 %
ERYTHROCYTE [DISTWIDTH] IN BLOOD BY AUTOMATED COUNT: 12.8 %
EST. GFR  (AFRICAN AMERICAN): >60 ML/MIN/1.73 M^2
EST. GFR  (NON AFRICAN AMERICAN): >60 ML/MIN/1.73 M^2
GLUCOSE SERPL-MCNC: 262 MG/DL
HCT VFR BLD AUTO: 39.9 %
HGB BLD-MCNC: 12.7 G/DL
IMM GRANULOCYTES # BLD AUTO: 0.01 K/UL
IMM GRANULOCYTES NFR BLD AUTO: 0.2 %
LYMPHOCYTES # BLD AUTO: 2.4 K/UL
LYMPHOCYTES NFR BLD: 42.3 %
MCH RBC QN AUTO: 28.2 PG
MCHC RBC AUTO-ENTMCNC: 31.8 G/DL
MCV RBC AUTO: 89 FL
MONOCYTES # BLD AUTO: 0.4 K/UL
MONOCYTES NFR BLD: 7 %
NEUTROPHILS # BLD AUTO: 2.7 K/UL
NEUTROPHILS NFR BLD: 47.2 %
NRBC BLD-RTO: 0 /100 WBC
PLATELET # BLD AUTO: 331 K/UL
PMV BLD AUTO: 10.6 FL
POTASSIUM SERPL-SCNC: 4.2 MMOL/L
PROT SERPL-MCNC: 7.5 G/DL
RBC # BLD AUTO: 4.51 M/UL
SODIUM SERPL-SCNC: 139 MMOL/L
TROPONIN I SERPL DL<=0.01 NG/ML-MCNC: <0.006 NG/ML
WBC # BLD AUTO: 5.74 K/UL

## 2018-04-23 PROCEDURE — 99284 EMERGENCY DEPT VISIT MOD MDM: CPT | Mod: ,,, | Performed by: PHYSICIAN ASSISTANT

## 2018-04-23 PROCEDURE — 93005 ELECTROCARDIOGRAM TRACING: CPT

## 2018-04-23 PROCEDURE — 83880 ASSAY OF NATRIURETIC PEPTIDE: CPT

## 2018-04-23 PROCEDURE — 93010 ELECTROCARDIOGRAM REPORT: CPT | Mod: ,,, | Performed by: INTERNAL MEDICINE

## 2018-04-23 PROCEDURE — 84484 ASSAY OF TROPONIN QUANT: CPT

## 2018-04-23 PROCEDURE — 85025 COMPLETE CBC W/AUTO DIFF WBC: CPT

## 2018-04-23 PROCEDURE — 80053 COMPREHEN METABOLIC PANEL: CPT

## 2018-04-23 PROCEDURE — 99284 EMERGENCY DEPT VISIT MOD MDM: CPT | Mod: 25

## 2018-04-23 NOTE — ED PROVIDER NOTES
Encounter Date: 4/23/2018       History     Chief Complaint   Patient presents with    Multiple Complaints     chest pain sob, i been here lots of times for same thing, denies cardiac hx     Nyasia Vazquez is a 66 y.o. Female with a PMHx of DM, arthritis, asthma, HTN, DM, and CAD who presented to the hospital with chest pain and worsening shortness of breath on exertion.  Patient states she began having chest pain 6 days ago and worsening shortness of breath when ambulating.  Patient states her chest pain is located on the left side of her chest and has been intermittent in nature.  Patient states the chest pain got worse over the last 24 hours and is now constant and radiating down her left arm.  Patient denies any PND or orthopnea.  Patient denies any fevers, chills, nausea, vomiting, or any other complaints.          Review of patient's allergies indicates:  No Known Allergies  Past Medical History:   Diagnosis Date    Asthma     Diabetes mellitus     Hypertension      History reviewed. No pertinent surgical history.  History reviewed. No pertinent family history.  Social History   Substance Use Topics    Smoking status: Never Smoker    Smokeless tobacco: Not on file    Alcohol use No     Review of Systems   Constitutional: Negative for activity change, appetite change, diaphoresis, fatigue and fever.   HENT: Negative for congestion, dental problem, drooling, ear pain, facial swelling, sore throat and trouble swallowing.    Eyes: Negative for pain, discharge and visual disturbance.   Respiratory: Positive for shortness of breath. Negative for apnea, cough and chest tightness.    Cardiovascular: Positive for chest pain. Negative for palpitations.   Gastrointestinal: Negative for abdominal distention, anal bleeding, blood in stool, diarrhea, nausea and vomiting.   Endocrine: Negative for cold intolerance and polydipsia.   Genitourinary: Negative for decreased urine volume, difficulty urinating,  enuresis, frequency and hematuria.   Musculoskeletal: Negative for arthralgias, gait problem, myalgias and neck stiffness.   Skin: Negative for color change and pallor.   Allergic/Immunologic: Negative for environmental allergies.   Neurological: Negative for dizziness, syncope, numbness and headaches.   Psychiatric/Behavioral: Negative for agitation, confusion and dysphoric mood.       Physical Exam     Initial Vitals [04/23/18 1354]   BP Pulse Resp Temp SpO2   (!) 144/73 75 18 98.7 °F (37.1 °C) 97 %      MAP       96.67         Physical Exam    Nursing note and vitals reviewed.  Constitutional: She appears well-developed and well-nourished. She is not diaphoretic. No distress.   HENT:   Head: Normocephalic and atraumatic.   Neck: Normal range of motion. Neck supple.   Cardiovascular: Normal rate, regular rhythm and normal heart sounds. Exam reveals no gallop and no friction rub.    No murmur heard.  Pulmonary/Chest: Breath sounds normal. She has no wheezes. She has no rhonchi. She has no rales.   Abdominal: Soft. Bowel sounds are normal. There is no tenderness. There is no rebound and no guarding.   Musculoskeletal: Normal range of motion.   Neurological: She is alert and oriented to person, place, and time.   Skin: Skin is warm and dry. No rash noted. No erythema.   Psychiatric: She has a normal mood and affect.         ED Course   Procedures  Labs Reviewed   CBC W/ AUTO DIFFERENTIAL - Abnormal; Notable for the following:        Result Value    MCHC 31.8 (*)     All other components within normal limits   COMPREHENSIVE METABOLIC PANEL - Abnormal; Notable for the following:     CO2 30 (*)     Glucose 262 (*)     Albumin 3.3 (*)     Anion Gap 7 (*)     All other components within normal limits   TROPONIN I   B-TYPE NATRIURETIC PEPTIDE                   APC / Resident Notes:   Nyasia Vazquez is a 66 y.o. Female with a PMHx of DM, arthritis, asthma, HTN, DM, and CAD who presented to the hospital with chest pain  and worsening shortness of breath on exertion.  Physical exam reveals female in no acute distress.  Heart regular rate and rhythm.  Lungs clear to auscultation bilaterally.  Abdomen soft nontender nondistended.  No lower extremity swelling.  Will obtain lab work and chest x-ray.     CBC unremarkable.  CMP shows glucose 262.  Troponin 0.006.  BMP 27.  Chest x-ray shows no evidence of acute cardiopulmonary disease.  Patient will be discharged home in stable condition.  Patient told to return if symptoms worsen.  Plan of treatment discussed with attending physician he is agreeable.                 Clinical Impression:   The encounter diagnosis was Chest pain.    Disposition:   Disposition: Discharged  Condition: Stable                        Fang Vazquez PA-C  04/24/18 0001

## 2018-04-23 NOTE — ED TRIAGE NOTES
Pt report having CP and SOB for 6 days. Pt states CP worse this AM and called primary. Pt states PCP instructed to come to ED. Pt alert and oriented. No distress noted.

## 2018-04-23 NOTE — ED NOTES
LOC: The patient is awake, alert, and oriented to place, time, situation. Affect is appropriated.  Speech is appropriate and clear.     APPEARANCE: Patient resting comfortably in no acute distress.  Patient is clean and well groomed.    SKIN: The skin is warm and dry; color consistent with ethnicity.  Patient has normal skin turgor and moist mucus membranes.  Skin intact; no breakdown or bruising noted.     MUSCULOSKELETAL: Patient moving upper and lower extremities without difficulty.  Denies weakness.     RESPIRATORY: Airway is open and patent. Lungs clear to auscultation in all lobes. Respirations spontaneous, even, easy, and non-labored.  Patient has a normal effort and rate.  No accessory muscle use noted. Denies cough.     CARDIAC:  Normal rhythm and rate noted.  No peripheral edema noted.  Capillary refill < 3 seconds. See focused assessment     ABDOMEN: Soft and non tender to palpation.  No distention noted. Bowel sounds present x 4.    NEUROLOGIC: PERRLA;  eyes open spontaneously.  Behavior appropriate to situation.  Follows commands; facial expression symmetrical; equal hand grasps bilaterally.  Purposeful motor response noted; normal sensation in all extremities.

## 2018-04-23 NOTE — PROVIDER PROGRESS NOTES - EMERGENCY DEPT.
Encounter Date: 4/23/2018    ED Physician Progress Notes         EKG - STEMI Decision  Initial Reading: No STEMI present.  Response: No Action Needed.

## 2018-07-11 ENCOUNTER — HOSPITAL ENCOUNTER (EMERGENCY)
Facility: HOSPITAL | Age: 68
Discharge: HOME | End: 2018-07-11
Attending: EMERGENCY MEDICINE
Payer: MEDICARE

## 2018-07-11 VITALS
RESPIRATION RATE: 18 BRPM | HEART RATE: 54 BPM | HEIGHT: 66 IN | OXYGEN SATURATION: 98 % | BODY MASS INDEX: 32.3 KG/M2 | WEIGHT: 201 LBS | TEMPERATURE: 98 F | DIASTOLIC BLOOD PRESSURE: 51 MMHG | SYSTOLIC BLOOD PRESSURE: 104 MMHG

## 2018-07-11 DIAGNOSIS — R61 NIGHT SWEATS: ICD-10-CM

## 2018-07-11 LAB
ALBUMIN SERPL BCP-MCNC: 3.9 G/DL
ALP SERPL-CCNC: 115 U/L
ALT SERPL W/O P-5'-P-CCNC: 13 U/L
ANION GAP SERPL CALC-SCNC: 9 MMOL/L
AST SERPL-CCNC: 20 U/L
BASOPHILS # BLD AUTO: 0.09 K/UL
BASOPHILS NFR BLD: 1.8 %
BILIRUB SERPL-MCNC: 0.8 MG/DL
BILIRUB UR QL STRIP: NEGATIVE
BUN SERPL-MCNC: 16 MG/DL
CALCIUM SERPL-MCNC: 9.9 MG/DL
CHLORIDE SERPL-SCNC: 102 MMOL/L
CLARITY UR REFRACT.AUTO: CLEAR
CO2 SERPL-SCNC: 29 MMOL/L
COLOR UR AUTO: YELLOW
CREAT SERPL-MCNC: 0.9 MG/DL
DIFFERENTIAL METHOD: ABNORMAL
EOSINOPHIL # BLD AUTO: 0.5 K/UL
EOSINOPHIL NFR BLD: 9.3 %
ERYTHROCYTE [DISTWIDTH] IN BLOOD BY AUTOMATED COUNT: 14.8 %
EST. GFR  (AFRICAN AMERICAN): >60 ML/MIN/1.73 M^2
EST. GFR  (NON AFRICAN AMERICAN): >60 ML/MIN/1.73 M^2
GLUCOSE SERPL-MCNC: 160 MG/DL
GLUCOSE UR QL STRIP: NEGATIVE
HCT VFR BLD AUTO: 41.9 %
HGB BLD-MCNC: 12.7 G/DL
HGB UR QL STRIP: NEGATIVE
IMM GRANULOCYTES # BLD AUTO: 0.01 K/UL
IMM GRANULOCYTES NFR BLD AUTO: 0.2 %
KETONES UR QL STRIP: NEGATIVE
LEUKOCYTE ESTERASE UR QL STRIP: NEGATIVE
LYMPHOCYTES # BLD AUTO: 2.2 K/UL
LYMPHOCYTES NFR BLD: 43.5 %
MCH RBC QN AUTO: 27.3 PG
MCHC RBC AUTO-ENTMCNC: 30.3 G/DL
MCV RBC AUTO: 90 FL
MONOCYTES # BLD AUTO: 0.5 K/UL
MONOCYTES NFR BLD: 9.3 %
NEUTROPHILS # BLD AUTO: 1.8 K/UL
NEUTROPHILS NFR BLD: 35.9 %
NITRITE UR QL STRIP: NEGATIVE
NRBC BLD-RTO: 0 /100 WBC
PH UR STRIP: 6 [PH] (ref 5–8)
PLATELET # BLD AUTO: 310 K/UL
PMV BLD AUTO: 10 FL
POTASSIUM SERPL-SCNC: 4.2 MMOL/L
PROT SERPL-MCNC: 7.8 G/DL
PROT UR QL STRIP: NEGATIVE
RBC # BLD AUTO: 4.65 M/UL
SODIUM SERPL-SCNC: 140 MMOL/L
SP GR UR STRIP: 1.01 (ref 1–1.03)
URN SPEC COLLECT METH UR: NORMAL
UROBILINOGEN UR STRIP-ACNC: NEGATIVE EU/DL
WBC # BLD AUTO: 5.06 K/UL

## 2018-07-11 PROCEDURE — 80053 COMPREHEN METABOLIC PANEL: CPT

## 2018-07-11 PROCEDURE — 81003 URINALYSIS AUTO W/O SCOPE: CPT

## 2018-07-11 PROCEDURE — 85025 COMPLETE CBC W/AUTO DIFF WBC: CPT

## 2018-07-11 PROCEDURE — 99284 EMERGENCY DEPT VISIT MOD MDM: CPT | Mod: 25

## 2018-07-11 PROCEDURE — 99283 EMERGENCY DEPT VISIT LOW MDM: CPT | Mod: ,,, | Performed by: EMERGENCY MEDICINE

## 2018-07-11 NOTE — ED NOTES
LOC: The patient is awake, alert and aware of environment with an appropriate affect, the patient is oriented x 3 and speaking appropriately.  APPEARANCE: Patient resting comfortably and in no acute distress, patient is clean and well groomed, patient's clothing is properly fastened.  SKIN: The skin is warm and dry, color consistent with ethnicity, patient has normal skin turgor and moist mucus membranes, skin intact, no breakdown or bruising noted.  MUSCULOSKELETAL: Patient moving all extremities well, no obvious swelling or deformities noted. Patient c/o pain to left leg.  RESPIRATORY: Airway is open and patent, respirations are spontaneous, patient has a normal effort and rate, no accessory muscle use noted.  CARDIAC: Patient has a normal rate and rhythm, no periphreal edema noted, capillary refill < 3 seconds.  ABDOMEN: Soft and non tender to palpation, no distention noted.  NEUROLOGIC: eyes open spontaneously, behavior appropriate to situation, follows commands, facial expression symmetrical, bilateral hand grasp equal and even, purposeful motor response noted, normal sensation in all extremities when touched with a finger.

## 2018-07-11 NOTE — ED TRIAGE NOTES
"Patient states she has had fever and been clammy for the last 2 months on and off.  Patient c/o night sweats. Patient states it is not "hot flashes".   "

## 2018-07-11 NOTE — ED PROVIDER NOTES
"Encounter Date: 7/11/2018    SCRIBE #1 NOTE: I, Cong Smith, am scribing for, and in the presence of,  Dr. Zuniga. I have scribed the following portions of the note - the Resident attestation.       History     Chief Complaint   Patient presents with    Fever     Pt presented to the ED via pov. Pt c/o fever x 2 months. Pt states she has night sweats. Pt denies any other s/s. Pt alert.      Patient is 68 yo Female with DM, HTN, and asthma presents for evaluation of increased "clamminess" for the past 2 months. She also states that she has felt subjectively hot during this time but has not measured her temperature. She states that the sweating occurs day and night. It is worse at night but it is not so much that it drenches her clothes. This has not happened prior to the last 2 months and she states they do not feel like hot flashes. Menopause at 46 years old. She denies chills, chest pain, SOB, hemoptysis, hematochezia, weight loss, or weight gain. She also denies urinary symptoms. She has no history new of medication use.          Review of patient's allergies indicates:  No Known Allergies  Past Medical History:   Diagnosis Date    Asthma     Diabetes mellitus     Hypertension      History reviewed. No pertinent surgical history.  History reviewed. No pertinent family history.  Social History   Substance Use Topics    Smoking status: Never Smoker    Smokeless tobacco: Never Used    Alcohol use No     Review of Systems   Constitutional: Positive for diaphoresis, fatigue and fever. Negative for chills and unexpected weight change.   HENT: Negative for hearing loss and trouble swallowing.    Eyes: Negative for visual disturbance.   Respiratory: Negative for cough, shortness of breath and wheezing.    Cardiovascular: Negative for chest pain.   Gastrointestinal: Negative for abdominal pain, blood in stool, diarrhea, nausea and vomiting.   Genitourinary: Negative for decreased urine volume, dysuria, flank pain and " pelvic pain.   Musculoskeletal: Negative for arthralgias and gait problem.   Skin: Negative for color change.   Neurological: Negative for dizziness, weakness, light-headedness and headaches.   Psychiatric/Behavioral: Negative for confusion.       Physical Exam     Initial Vitals [07/11/18 0736]   BP Pulse Resp Temp SpO2   (!) 141/67 (!) 56 17 98.4 °F (36.9 °C) 97 %      MAP       --         Physical Exam    Constitutional: She appears well-developed and well-nourished. She is not diaphoretic. No distress.   HENT:   Head: Normocephalic and atraumatic.   Nose: Nose normal.   Eyes: EOM are normal. Pupils are equal, round, and reactive to light.   Neck: Neck supple.   Cardiovascular: Normal rate, regular rhythm and normal heart sounds. Exam reveals no gallop and no friction rub.    No murmur heard.  Pulmonary/Chest: Breath sounds normal. No respiratory distress. She has no wheezes. She has no rhonchi. She has no rales.   Abdominal: Soft. Bowel sounds are normal. There is no tenderness. There is no rebound and no guarding.   Musculoskeletal: She exhibits no edema.   Lymphadenopathy:     She has no cervical adenopathy.   Neurological: She is alert and oriented to person, place, and time.   Skin: Skin is warm and dry.   Psychiatric: She has a normal mood and affect.         ED Course   Procedures  Labs Reviewed   CBC W/ AUTO DIFFERENTIAL - Abnormal; Notable for the following:        Result Value    MCHC 30.3 (*)     RDW 14.8 (*)     Gran% 35.9 (*)     Eosinophil% 9.3 (*)     All other components within normal limits   COMPREHENSIVE METABOLIC PANEL - Abnormal; Notable for the following:     Glucose 160 (*)     All other components within normal limits   URINALYSIS, REFLEX TO URINE CULTURE    Narrative:     Preferred Collection Type->Urine, Clean Catch          Imaging Results          X-Ray Chest PA And Lateral (Final result)  Result time 07/11/18 08:54:43    Final result by Vinicius Arvizu III, MD (07/11/18 08:54:43)                  Impression:      See above    No acute process seen.      Electronically signed by: Vinicius Arvizu MD  Date:    07/11/2018  Time:    08:54             Narrative:    EXAMINATION:  XR CHEST PA AND LATERAL    CLINICAL HISTORY:  Generalized hyperhidrosis    FINDINGS:  Heart is normal there is DJD a ordered plaque.  Lungs are clear.                                 Medical Decision Making:   History:   Old Medical Records: I decided to obtain old medical records.  Initial Assessment:   Patient is 68 yo female with DM and HTN presents for evaluation of increased sweating and subjective fever x 2 months. No adenopathy noted on exam. No history of new medications. Afebrile on exam. Ordered CBC, CMP, Chest Xray, UA. DDx for night sweats include: infection, TB, or medication side effect.     CMP, CBC, UA and Chest xray are unremarkable.     Symptoms do not seem to be explained by medication side effect since patient does not have history of starting new medication in the past 2 months. CBC obtained for infection, found no leukocytosis so less likely. CXR obtained to assess for active TB, no masses seen on xray so less likely. May be due to idiopathic hyperhidrosis, but etiology unclear at this time. Encouraged patient to follow up with PCP for further workup.  Clinical Tests:   Lab Tests: Ordered and Reviewed  Radiological Study: Ordered and Reviewed            Scribe Attestation:   Scribe #1: I performed the above scribed service and the documentation accurately describes the services I performed. I attest to the accuracy of the note.    Attending Attestation:   Physician Attestation Statement for Resident:  As the supervising MD   Physician Attestation Statement: I have personally seen and examined this patient.   I agree with the above history. -:   As the supervising MD I agree with the above PE.    As the supervising MD I agree with the above treatment, course, plan, and disposition.   -: 67 y.o. female who  has felt hot and sweaty at night for 2 months. She has never measured a fever. Labs and chest X-ray are all normal. Her physical exam is benign. Will discharge for PCP follow up.   I have reviewed and agree with the residents interpretation of the following: lab data and x-rays.  I have reviewed the following: old records at this facility.                       Clinical Impression:   Idiopathic hyperhidrosis        Disposition:   Disposition: Discharged  Condition: Stable                        Shaina Bernardo MD  Resident  07/11/18 0164

## 2018-07-11 NOTE — Clinical Note
Nyasia Vazquez discharge to home/self care.    - Condition at discharge: Good  - Mode of Discharge: by walking out   - The patient left the ED accompanied by a family member.  - The discharge instructions were discussed with the patient/parent.  -  They state an understanding of the discharge instructions.  - Instructed patient/parent to go to the discharge window.

## 2018-07-11 NOTE — ED NOTES
I acknowledge care of this pt. Pt AAO x 4 sitting in chair. Respirations are unlabored and equal. NAD noted. Pt awaiting test result and imaging. Will continue to monitor.

## 2019-08-26 ENCOUNTER — HOSPITAL ENCOUNTER (EMERGENCY)
Facility: HOSPITAL | Age: 69
Discharge: HOME | End: 2019-08-26
Attending: EMERGENCY MEDICINE
Payer: MEDICARE

## 2019-08-26 VITALS
RESPIRATION RATE: 18 BRPM | HEART RATE: 58 BPM | SYSTOLIC BLOOD PRESSURE: 114 MMHG | OXYGEN SATURATION: 97 % | TEMPERATURE: 99 F | DIASTOLIC BLOOD PRESSURE: 56 MMHG | HEIGHT: 66 IN | BODY MASS INDEX: 33.43 KG/M2 | WEIGHT: 208 LBS

## 2019-08-26 DIAGNOSIS — R10.2 PELVIC PAIN: Primary | ICD-10-CM

## 2019-08-26 LAB
ALBUMIN SERPL BCP-MCNC: 3.5 G/DL (ref 3.5–5.2)
ALP SERPL-CCNC: 103 U/L (ref 55–135)
ALT SERPL W/O P-5'-P-CCNC: 16 U/L (ref 10–44)
ANION GAP SERPL CALC-SCNC: 8 MMOL/L (ref 8–16)
AST SERPL-CCNC: 16 U/L (ref 10–40)
BACTERIA #/AREA URNS AUTO: NORMAL /HPF
BACTERIA GENITAL QL WET PREP: ABNORMAL
BASOPHILS # BLD AUTO: 0.06 K/UL (ref 0–0.2)
BASOPHILS NFR BLD: 0.9 % (ref 0–1.9)
BILIRUB SERPL-MCNC: 0.6 MG/DL (ref 0.1–1)
BILIRUB UR QL STRIP: NEGATIVE
BUN SERPL-MCNC: 13 MG/DL (ref 8–23)
C TRACH DNA SPEC QL NAA+PROBE: NOT DETECTED
CALCIUM SERPL-MCNC: 9.4 MG/DL (ref 8.7–10.5)
CHLORIDE SERPL-SCNC: 108 MMOL/L (ref 95–110)
CLARITY UR REFRACT.AUTO: CLEAR
CLUE CELLS VAG QL WET PREP: ABNORMAL
CO2 SERPL-SCNC: 25 MMOL/L (ref 23–29)
COLOR UR AUTO: YELLOW
CREAT SERPL-MCNC: 0.8 MG/DL (ref 0.5–1.4)
DIFFERENTIAL METHOD: ABNORMAL
EOSINOPHIL # BLD AUTO: 0.2 K/UL (ref 0–0.5)
EOSINOPHIL NFR BLD: 2.4 % (ref 0–8)
ERYTHROCYTE [DISTWIDTH] IN BLOOD BY AUTOMATED COUNT: 13.5 % (ref 11.5–14.5)
EST. GFR  (AFRICAN AMERICAN): >60 ML/MIN/1.73 M^2
EST. GFR  (NON AFRICAN AMERICAN): >60 ML/MIN/1.73 M^2
FILAMENT FUNGI VAG WET PREP-#/AREA: ABNORMAL
GLUCOSE SERPL-MCNC: 96 MG/DL (ref 70–110)
GLUCOSE UR QL STRIP: ABNORMAL
HCT VFR BLD AUTO: 39.6 % (ref 37–48.5)
HGB BLD-MCNC: 12.4 G/DL (ref 12–16)
HGB UR QL STRIP: NEGATIVE
IMM GRANULOCYTES # BLD AUTO: 0.02 K/UL (ref 0–0.04)
IMM GRANULOCYTES NFR BLD AUTO: 0.3 % (ref 0–0.5)
KETONES UR QL STRIP: NEGATIVE
LEUKOCYTE ESTERASE UR QL STRIP: NEGATIVE
LIPASE SERPL-CCNC: 22 U/L (ref 4–60)
LYMPHOCYTES # BLD AUTO: 1.9 K/UL (ref 1–4.8)
LYMPHOCYTES NFR BLD: 27.4 % (ref 18–48)
MCH RBC QN AUTO: 28.5 PG (ref 27–31)
MCHC RBC AUTO-ENTMCNC: 31.3 G/DL (ref 32–36)
MCV RBC AUTO: 91 FL (ref 82–98)
MICROSCOPIC COMMENT: NORMAL
MONOCYTES # BLD AUTO: 0.7 K/UL (ref 0.3–1)
MONOCYTES NFR BLD: 10.1 % (ref 4–15)
N GONORRHOEA DNA SPEC QL NAA+PROBE: NOT DETECTED
NEUTROPHILS # BLD AUTO: 4.2 K/UL (ref 1.8–7.7)
NEUTROPHILS NFR BLD: 58.9 % (ref 38–73)
NITRITE UR QL STRIP: NEGATIVE
NRBC BLD-RTO: 0 /100 WBC
PH UR STRIP: 6 [PH] (ref 5–8)
PLATELET # BLD AUTO: 302 K/UL (ref 150–350)
PMV BLD AUTO: 10.4 FL (ref 9.2–12.9)
POTASSIUM SERPL-SCNC: 4.6 MMOL/L (ref 3.5–5.1)
PROT SERPL-MCNC: 7.3 G/DL (ref 6–8.4)
PROT UR QL STRIP: NEGATIVE
RBC # BLD AUTO: 4.35 M/UL (ref 4–5.4)
RBC #/AREA URNS AUTO: 2 /HPF (ref 0–4)
SODIUM SERPL-SCNC: 141 MMOL/L (ref 136–145)
SP GR UR STRIP: 1.01 (ref 1–1.03)
SPECIMEN SOURCE: ABNORMAL
SQUAMOUS #/AREA URNS AUTO: 1 /HPF
T VAGINALIS GENITAL QL WET PREP: ABNORMAL
URN SPEC COLLECT METH UR: ABNORMAL
WBC # BLD AUTO: 7.04 K/UL (ref 3.9–12.7)
WBC #/AREA URNS AUTO: 0 /HPF (ref 0–5)
WBC #/AREA VAG WET PREP: ABNORMAL
YEAST GENITAL QL WET PREP: ABNORMAL
YEAST UR QL AUTO: NORMAL

## 2019-08-26 PROCEDURE — 83690 ASSAY OF LIPASE: CPT

## 2019-08-26 PROCEDURE — 25500020 PHARM REV CODE 255: Performed by: EMERGENCY MEDICINE

## 2019-08-26 PROCEDURE — 87491 CHLMYD TRACH DNA AMP PROBE: CPT

## 2019-08-26 PROCEDURE — 81001 URINALYSIS AUTO W/SCOPE: CPT

## 2019-08-26 PROCEDURE — 87210 SMEAR WET MOUNT SALINE/INK: CPT

## 2019-08-26 PROCEDURE — 85025 COMPLETE CBC W/AUTO DIFF WBC: CPT

## 2019-08-26 PROCEDURE — 99284 EMERGENCY DEPT VISIT MOD MDM: CPT | Mod: ,,, | Performed by: PHYSICIAN ASSISTANT

## 2019-08-26 PROCEDURE — 99285 EMERGENCY DEPT VISIT HI MDM: CPT | Mod: 25

## 2019-08-26 PROCEDURE — 80053 COMPREHEN METABOLIC PANEL: CPT

## 2019-08-26 PROCEDURE — 99284 PR EMERGENCY DEPT VISIT,LEVEL IV: ICD-10-PCS | Mod: ,,, | Performed by: PHYSICIAN ASSISTANT

## 2019-08-26 PROCEDURE — 25000003 PHARM REV CODE 250: Performed by: PHYSICIAN ASSISTANT

## 2019-08-26 RX ORDER — ACETAMINOPHEN 500 MG
1000 TABLET ORAL
Status: COMPLETED | OUTPATIENT
Start: 2019-08-26 | End: 2019-08-26

## 2019-08-26 RX ADMIN — ACETAMINOPHEN 1000 MG: 500 TABLET ORAL at 09:08

## 2019-08-26 RX ADMIN — IOHEXOL 100 ML: 350 INJECTION, SOLUTION INTRAVENOUS at 11:08

## 2019-08-26 NOTE — ED PROVIDER NOTES
Encounter Date: 8/26/2019       History     Chief Complaint   Patient presents with    Female  Problem     68-year-old female with history of diabetes, hypertension, asthma presents to the ED complaining of pelvic pain for the past 2 days.  She has a history of uterine prolapse s/p mesh sling placement in 2007 with a pessary that she changes at home.  She states that she took the pessary out 4 days ago and replaced it.  She was having some vague vaginal pain at that time but the pain worsened 2 days ago.  She has since taken the pessary out and has not replaced it.  She reports her pain is 10/10.  She has not taken any over-the-counter medications.  She does not use any vaginal creams.  She denies fever, chills, chest pain, shortness breath, vaginal bleeding, vaginal discharge, nausea, dysuria, headache. Her gyn is in [place], [place] - she is currently in town visiting family.        The history is provided by the patient.     Review of patient's allergies indicates:  No Known Allergies  Past Medical History:   Diagnosis Date    Asthma     Diabetes mellitus     Hypertension      History reviewed. No pertinent surgical history.  History reviewed. No pertinent family history.  Social History     Tobacco Use    Smoking status: Never Smoker    Smokeless tobacco: Never Used   Substance Use Topics    Alcohol use: No    Drug use: No     Review of Systems   Constitutional: Negative for chills and fever.   HENT: Negative for congestion, rhinorrhea and sore throat.    Eyes: Negative for photophobia and visual disturbance.   Respiratory: Negative for shortness of breath.    Cardiovascular: Negative for chest pain.   Gastrointestinal: Positive for abdominal pain. Negative for constipation, diarrhea, nausea and vomiting.   Genitourinary: Positive for pelvic pain and vaginal pain. Negative for dysuria, hematuria, vaginal bleeding and vaginal discharge.   Musculoskeletal: Negative for neck pain and neck stiffness.    Skin: Negative for rash and wound.   Neurological: Negative for light-headedness, numbness and headaches.   Psychiatric/Behavioral: Negative for confusion.       Physical Exam     Initial Vitals [08/26/19 0822]   BP Pulse Resp Temp SpO2   (!) 141/60 (!) 56 18 98.4 °F (36.9 °C) 98 %      MAP       --         Physical Exam    Nursing note and vitals reviewed.  Constitutional: She appears well-developed and well-nourished. She is not diaphoretic. No distress.   HENT:   Head: Normocephalic and atraumatic.   Neck: Normal range of motion. Neck supple.   Cardiovascular: Normal rate, regular rhythm and normal heart sounds. Exam reveals no gallop and no friction rub.    No murmur heard.  Pulmonary/Chest: Breath sounds normal. She has no wheezes. She has no rhonchi. She has no rales.   Abdominal: Soft. Bowel sounds are normal. There is tenderness in the suprapubic area. There is no rebound and no guarding.   Tenderness in the suprapubic/pelvic region   Genitourinary: There is no rash or tenderness on the right labia. There is no rash or tenderness on the left labia. Cervix exhibits no discharge and no friability. There is tenderness in the vagina. No erythema or bleeding in the vagina. No vaginal discharge found.   Genitourinary Comments: Vaginal pain with pelvic and bimanual exam   Musculoskeletal: Normal range of motion.   Neurological: She is alert and oriented to person, place, and time.   Skin: Skin is warm and dry. No rash noted. No erythema.   Psychiatric: She has a normal mood and affect.         ED Course   Procedures  Labs Reviewed   CBC W/ AUTO DIFFERENTIAL - Abnormal; Notable for the following components:       Result Value    Mean Corpuscular Hemoglobin Conc 31.3 (*)     All other components within normal limits   URINALYSIS, REFLEX TO URINE CULTURE - Abnormal; Notable for the following components:    Glucose, UA 3+ (*)     All other components within normal limits    Narrative:     Preferred Collection  Type->Urine, Clean Catch   VAGINAL SCREEN - Abnormal; Notable for the following components:    Bacteria - Vaginal Screen Rare (*)     All other components within normal limits   C. TRACHOMATIS/N. GONORRHOEAE BY AMP DNA   COMPREHENSIVE METABOLIC PANEL   LIPASE   URINALYSIS MICROSCOPIC    Narrative:     Preferred Collection Type->Urine, Clean Catch          Imaging Results          CT Abdomen Pelvis With Contrast (Final result)  Result time 08/26/19 12:30:04    Final result by Bucky Manley MD (08/26/19 12:30:04)                 Impression:      No acute intra-abdominal findings.    Electronically signed by resident: Micheal Miranda  Date:    08/26/2019  Time:    11:53    Electronically signed by: Bucky Manley MD  Date:    08/26/2019  Time:    12:30             Narrative:    EXAMINATION:  CT ABDOMEN PELVIS WITH CONTRAST    CLINICAL HISTORY:  Abdominal pain, unspecified;    TECHNIQUE:  Low dose axial images, sagittal and coronal reformations were obtained from the lung bases to the pubic symphysis following the IV administration of 100 mL of Omnipaque 350 . Oral contrast was not administered.    COMPARISON:  Chest radiograph from 07/11/2018.    FINDINGS:  Heart: Normal in size. No pericardial effusion.    Lung Bases: Well aerated.  Bibasilar bandlike opacities which likely represent subsegmental atelectasis or scarring.  Mild dependent pleural thickening and nodularity, right greater than left.  No focal consolidation.  No significant pleural fluid.    Liver: Normal in size and attenuation, with no focal hepatic lesions. Portal vein is patent.    Gallbladder: No calcified gallstones.    Bile Ducts: No evidence of dilated ducts.    Pancreas: No mass or peripancreatic fat stranding.    Spleen: Unremarkable.    Adrenals: Unremarkable.    Kidneys/ Ureters: Normal in size and location. Normal concentration and excretion of contrast. No hydronephrosis or nephrolithiasis. No ureteral dilatation.    Bladder: No evidence of wall  thickening.    Reproductive organs: Unremarkable.    GI Tract/Mesentery: Apparent wall thickening of the stomach.  However, this may simply be due to the fact that the stomach is decompressed.  No other evidence of bowel obstruction or inflammation. Appendix visualized and unremarkable.    Peritoneal Space: No ascites. No free air.    Retroperitoneum:  No significant adenopathy.    Abdominal wall:  Small fat containing umbilical hernia.    Vasculature: Mild atherosclerosis without aneurysm.    Bones: No acute fracture. Age-appropriate degenerative changes.                                 Medical Decision Making:   History:   Old Medical Records: I decided to obtain old medical records.  Clinical Tests:   Lab Tests: Ordered and Reviewed  Radiological Study: Ordered and Reviewed       APC / Resident Notes:   68-year-old female with history of diabetes, hypertension, asthma presents to the ED complaining of pelvic pain for the past 2 days.  Vital signs stable. Tenderness noted to the lower abdomen/pelvis.  Vaginal pain with pelvic exam and bimanual exam.  There is no vaginal bleeding, discharge, erythema appreciated. Cervix is normal appearing.  Differential diagnosis includes but is not limited to UTI, vaginal infection, yeast infection, infected mesh, colitis.  Will obtain labs and CT abdomen pelvis for further evaluation.    UA with no infection. Vaginal screen normal. No leukocytosis. CMP unremarkable. Lipase normal.     CT abdomen/pelvis with no acute findings.     Treated with tylenol. I do not feel that she needs any further labs or imaging at this time. Stable for discharge.     She was discharged without any new prescriptions.  She will take OTC tylenol as needed for pain.  She will follow up with her GYN.  She plans on being in [place] for the rest of the week visiting family.  All of the patient's questions were answered.  I reviewed the patient's chart, labs, and imaging and discussed the case with my  supervising physician.            Attending Attestation:     Physician Attestation Statement for NP/PA:   I discussed this assessment and plan of this patient with the NP/PA, but I did not personally examine the patient. The face to face encounter was performed by the NP/PA.                     Clinical Impression:       ICD-10-CM ICD-9-CM   1. Pelvic pain R10.2 [id number]         Disposition:   Disposition: Discharged  Condition: Stable                        Jud Tavarez PA-C  08/26/19 1546       Kirt Fong MD  08/29/19 1008

## 2019-08-26 NOTE — ED TRIAGE NOTES
Pt arrived POV from home. Pt uses pessary and for the past 2-3 days pt reports having aching pain and tenderness all the time. Pt has been using one for the past 10 years and reported having an infection once before several years ago. Pt denies dysuria at this time, but does report 9/10 pain.